# Patient Record
Sex: MALE | Race: WHITE
[De-identification: names, ages, dates, MRNs, and addresses within clinical notes are randomized per-mention and may not be internally consistent; named-entity substitution may affect disease eponyms.]

---

## 2019-08-01 ENCOUNTER — HOSPITAL ENCOUNTER (INPATIENT)
Dept: HOSPITAL 74 - JER | Age: 49
LOS: 5 days | Discharge: HOME | DRG: 223 | End: 2019-08-06
Payer: COMMERCIAL

## 2019-08-01 VITALS — BODY MASS INDEX: 25.9 KG/M2

## 2019-08-01 DIAGNOSIS — K31.9: ICD-10-CM

## 2019-08-01 DIAGNOSIS — K65.9: ICD-10-CM

## 2019-08-01 DIAGNOSIS — F17.210: ICD-10-CM

## 2019-08-01 DIAGNOSIS — I83.93: ICD-10-CM

## 2019-08-01 DIAGNOSIS — I10: ICD-10-CM

## 2019-08-01 DIAGNOSIS — E66.9: ICD-10-CM

## 2019-08-01 DIAGNOSIS — E87.2: ICD-10-CM

## 2019-08-01 DIAGNOSIS — M21.612: ICD-10-CM

## 2019-08-01 DIAGNOSIS — K29.60: ICD-10-CM

## 2019-08-01 DIAGNOSIS — M54.5: ICD-10-CM

## 2019-08-01 DIAGNOSIS — M54.9: ICD-10-CM

## 2019-08-01 DIAGNOSIS — K25.5: Primary | ICD-10-CM

## 2019-08-01 DIAGNOSIS — T39.395A: ICD-10-CM

## 2019-08-01 DIAGNOSIS — Z79.1: ICD-10-CM

## 2019-08-01 DIAGNOSIS — R62.59: ICD-10-CM

## 2019-08-01 LAB
ALBUMIN SERPL-MCNC: 4.2 G/DL (ref 3.4–5)
ALP SERPL-CCNC: 106 U/L (ref 45–117)
ALT SERPL-CCNC: 28 U/L (ref 13–61)
ANION GAP SERPL CALC-SCNC: 8 MMOL/L (ref 8–16)
AST SERPL-CCNC: 13 U/L (ref 15–37)
BASOPHILS # BLD: 0.4 % (ref 0–2)
BILIRUB SERPL-MCNC: 0.6 MG/DL (ref 0.2–1)
BUN SERPL-MCNC: 12.5 MG/DL (ref 7–18)
CALCIUM SERPL-MCNC: 10.1 MG/DL (ref 8.5–10.1)
CHLORIDE SERPL-SCNC: 103 MMOL/L (ref 98–107)
CO2 SERPL-SCNC: 28 MMOL/L (ref 21–32)
CREAT SERPL-MCNC: 1 MG/DL (ref 0.55–1.3)
DEPRECATED RDW RBC AUTO: 14 % (ref 11.9–15.9)
EOSINOPHIL # BLD: 0.3 % (ref 0–4.5)
GLUCOSE SERPL-MCNC: 169 MG/DL (ref 74–106)
HCT VFR BLD CALC: 47 % (ref 35.4–49)
HGB BLD-MCNC: 16 GM/DL (ref 11.7–16.9)
INR BLD: 0.94 (ref 0.83–1.09)
LIPASE SERPL-CCNC: 544 U/L (ref 73–393)
LYMPHOCYTES # BLD: 7.1 % (ref 8–40)
MCH RBC QN AUTO: 30.7 PG (ref 25.7–33.7)
MCHC RBC AUTO-ENTMCNC: 34.1 G/DL (ref 32–35.9)
MCV RBC: 90 FL (ref 80–96)
MONOCYTES # BLD AUTO: 4.5 % (ref 3.8–10.2)
NEUTROPHILS # BLD: 87.7 % (ref 42.8–82.8)
PLATELET # BLD AUTO: 229 K/MM3 (ref 134–434)
PMV BLD: 9.3 FL (ref 7.5–11.1)
POTASSIUM SERPLBLD-SCNC: 4.4 MMOL/L (ref 3.5–5.1)
PROT SERPL-MCNC: 7.8 G/DL (ref 6.4–8.2)
PT PNL PPP: 11.1 SEC (ref 9.7–13)
RBC # BLD AUTO: 5.23 M/MM3 (ref 4–5.6)
SODIUM SERPL-SCNC: 139 MMOL/L (ref 136–145)
WBC # BLD AUTO: 14.9 K/MM3 (ref 4–10)

## 2019-08-01 PROCEDURE — 0DU907Z SUPPLEMENT DUODENUM WITH AUTOLOGOUS TISSUE SUBSTITUTE, OPEN APPROACH: ICD-10-PCS

## 2019-08-01 RX ADMIN — PANTOPRAZOLE SODIUM ONE MG: 40 INJECTION, POWDER, FOR SOLUTION INTRAVENOUS at 21:05

## 2019-08-01 NOTE — CONSULT
Consultation: 


REQUESTING PROVIDER:Dr. Qureshi





CONSULT REQUEST: We have been asked to medically evaluate this patient for high 

blood pressure.





HISTORY OF PRESENT ILLNESS:


Patient is a 49 year old male with no significant past medical history, 

presented to the ED due to sudden onset diffuse abdominal pain, radiating to 

the back that started this morning. Patient reported he experienced severe 

diffuse abdominal pain that woke him up this morning, that was accompanied by 

abdominal bloating. He denies any fever, chills, nausea or vomiting, diarrhea, 

constipation. Patient reported his only medication is Advil which he takes as 

needed for chronic back pain. Due to persistent pain, patient came to the ED.


At the ED, CT scan of the abdomen revealed pneumoperitoeum concerning for 

viscus perforation, free air within the right upper abdomen. Patient was 

evaluated by Surgery and sent directly to the OR.  





Past Medical History: none


Past Surgical History: Open appendectomy, left foot bunionectomy, discectomy 


Family history: Father: cancer (unknown as to type)


Social: Current smoker on and off for past 30 years. Denies alcohol 

consumption. Denies illicit drug use. 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 

carts.





REVIEW OF SYSTEMS:


CONSTITUTIONAL: 


Absent:  fever, chills, diaphoresis, generalized weakness, malaise, loss of 

appetite, weight change


HEENT: 


Absent:  rhinorrhea, nasal congestion, throat pain, throat swelling, difficulty 

swallowing, mouth swelling, ear pain, eye pain, visual changes


CARDIOVASCULAR: 


Absent: chest pain, syncope, palpitations, irregular heart rate, lightheadedness

, peripheral edema


RESPIRATORY: 


Absent: cough, shortness of breath, dyspnea with exertion, orthopnea, wheezing, 

stridor, hemoptysis


GASTROINTESTINAL: abdominal pain


Absent: abdominal distension, nausea, vomiting, diarrhea, constipation, melena, 

hematochezia


GENITOURINARY: 


Absent: dysuria, frequency, urgency, hesitancy, hematuria, flank pain, genital 

pain


MUSCULOSKELETAL: 


Absent: myalgia, arthralgia, joint swelling, back pain, neck pain


SKIN: 


Absent: rash, itching, pallor


HEMATOLOGIC/IMMUNOLOGIC: 


Absent: easy bleeding, easy bruising, lymphadenopathy, frequent infections


ENDOCRINE:


Absent: unexplained weight gain, unexplained weight loss, heat intolerance, 

cold intolerance


NEUROLOGIC: 


Absent: headache, focal weakness or paresthesias, dizziness, unsteady gait, 

seizure, mental status changes, bladder or bowel incontinence


PSYCHIATRIC: 


Absent: anxiety, depression, suicidal or homicidal ideation, hallucinations.





PHYSICAL EXAMINATION





 Vital Signs - 24 hr











  08/01/19 08/01/19 08/01/19





  11:15 16:42 17:27


 


Temperature 100.2 F H 99.8 F H 100.3 F H


 


Pulse Rate 98 H  


 


Pulse Rate [  86 97 H





Right Radial]   


 


Respiratory 19 19 20





Rate   


 


Blood Pressure 162/94  


 


Blood Pressure  136/85 132/78





[Left Arm]   


 


O2 Sat by Pulse 98 100 96





Oximetry (%)   














GENERAL: Awake, alert, and fully oriented, in mild distress.


HEAD: Normal with no signs of trauma.


EYES: PERRLA, EOMI, sclera anicteric, conjunctiva clear. 


EARS, NOSE, THROAT: Dry mucous membranes.


NECK: Normal range of motion, supple.


LUNGS: Breath sounds equal, clear to auscultation bilaterally.


HEART: Regular rate and rhythm, normal S1 and S2 without murmur, rub or gallop.


ABDOMEN: +Tenderness on all quadrants, nondistended, NABS. +Guarding.


UPPER EXTREMITIES: 2+ pulses, warm, well-perfused.No peripheral edema.


LOWER EXTREMITIES: 2+ pulses, warm, well-perfused. No peripheral edema. 


SKIN: Warm, dry, normal turgor, no rashes or lesions noted. 








 Laboratory Results - last 24 hr











  08/01/19 08/01/19 08/01/19





  11:53 11:53 11:56


 


WBC   


 


RBC   


 


Hgb   


 


Hct   


 


MCV   


 


MCH   


 


MCHC   


 


RDW   


 


Plt Count   


 


MPV   


 


Absolute Neuts (auto)   


 


Neutrophils %   


 


Lymphocytes %   


 


Monocytes %   


 


Eosinophils %   


 


Basophils %   


 


Nucleated RBC %   


 


PT with INR   


 


INR   


 


PTT (Actin FS)    30.8


 


Sodium   139 


 


Potassium   4.4 


 


Chloride   103 


 


Carbon Dioxide   28 


 


Anion Gap   8 


 


BUN   12.5 


 


Creatinine   1.0 


 


Est GFR (CKD-EPI)AfAm   101.26 


 


Est GFR (CKD-EPI)NonAf   87.37 


 


Random Glucose   169 H 


 


Lactic Acid   


 


Calcium   10.1 


 


Total Bilirubin   0.6 


 


AST   13 L 


 


ALT   28 


 


Alkaline Phosphatase   106 


 


Creatine Kinase  55  


 


Troponin I  < 0.02  


 


Total Protein   7.8 


 


Albumin   4.2 


 


Lipase   544 H 


 


Blood Type   


 


Antibody Screen   














  08/01/19 08/01/19 08/01/19





  11:56 11:56 13:06


 


WBC  14.9 H  


 


RBC  5.23  


 


Hgb  16.0  


 


Hct  47.0  


 


MCV  90.0  


 


MCH  30.7  


 


MCHC  34.1  


 


RDW  14.0  


 


Plt Count  229  


 


MPV  9.3  


 


Absolute Neuts (auto)  13.0 H  


 


Neutrophils %  87.7 H  


 


Lymphocytes %  7.1 L  


 


Monocytes %  4.5  


 


Eosinophils %  0.3  


 


Basophils %  0.4  


 


Nucleated RBC %  0  


 


PT with INR   11.10 


 


INR   0.94 


 


PTT (Actin FS)   


 


Sodium   


 


Potassium   


 


Chloride   


 


Carbon Dioxide   


 


Anion Gap   


 


BUN   


 


Creatinine   


 


Est GFR (CKD-EPI)AfAm   


 


Est GFR (CKD-EPI)NonAf   


 


Random Glucose   


 


Lactic Acid    2.4 H*


 


Calcium   


 


Total Bilirubin   


 


AST   


 


ALT   


 


Alkaline Phosphatase   


 


Creatine Kinase   


 


Troponin I   


 


Total Protein   


 


Albumin   


 


Lipase   


 


Blood Type   


 


Antibody Screen   














  08/01/19





  15:06


 


WBC 


 


RBC 


 


Hgb 


 


Hct 


 


MCV 


 


MCH 


 


MCHC 


 


RDW 


 


Plt Count 


 


MPV 


 


Absolute Neuts (auto) 


 


Neutrophils % 


 


Lymphocytes % 


 


Monocytes % 


 


Eosinophils % 


 


Basophils % 


 


Nucleated RBC % 


 


PT with INR 


 


INR 


 


PTT (Actin FS) 


 


Sodium 


 


Potassium 


 


Chloride 


 


Carbon Dioxide 


 


Anion Gap 


 


BUN 


 


Creatinine 


 


Est GFR (CKD-EPI)AfAm 


 


Est GFR (CKD-EPI)NonAf 


 


Random Glucose 


 


Lactic Acid 


 


Calcium 


 


Total Bilirubin 


 


AST 


 


ALT 


 


Alkaline Phosphatase 


 


Creatine Kinase 


 


Troponin I 


 


Total Protein 


 


Albumin 


 


Lipase 


 


Blood Type  B POSITIVE


 


Antibody Screen  Negative








Active Medications











Generic Name Dose Route Start Last Admin





  Trade Name Freq  PRN Reason Stop Dose Admin


 


Fentanyl  50 mcg  08/01/19 17:13  





  Sublimaze Injection -  IVPUSH   





  [id number] PRN   





  PAIN-PACU ORDER X 4 DOSES ONLY   





     





     





     


 


Heparin Sodium (Porcine)  5,000 unit  08/01/19 18:00  





  Heparin -  SQ   





  Q8H-IV CARLO   





     





     





     





     


 


Hydromorphone HCl  10 mg  08/01/19 17:15  





  Hydromorphone 10 Mg/50 Ml-Ns  PCA  08/08/19 17:14  





  PCA CARLO   





     





     





  Protocol   





     


 


Sodium Chloride  1,000 mls @ 42 mls/hr  08/01/19 15:30  08/01/19 15:46





  Normal Saline -  IV   42 mls/hr





  ASDIR CARLO   Administration





     





     





     





     


 


Lactated Ringer's  1,000 mls @ 125 mls/hr  08/01/19 17:00  





  Lactated Ringers Solution  IV   





  ASDIR CARLO   





     





     





     





     


 


Lactated Ringer's  1,000 mls @ 1,000 mls/hr  08/01/19 16:48  08/01/19 17:22





  Lactated Ringers Solution  IV  08/01/19 17:47  1,000 mls/hr





  ONCE STA   Administration





     





     





     





     


 


Levofloxacin  750 mg in 150 mls @ 100 mls/hr  08/01/19 17:01  08/01/19 17:23





  Levaquin 750 Mg Premixed Ivpb -  IVPB  08/01/19 18:30  100 mls/hr





  ONCE ONE   Administration





     





     





  Protocol   





     


 


Metronidazole  500 mg in 100 mls @ 100 mls/hr  08/01/19 18:00  





  Flagyl 500mg Premixed Ivpb -  IVPB   





  Q8H-IV CARLO   





     





     





     





     


 


Fluconazole  50 mls @ 50 mls/hr  08/01/19 17:15  





  Diflucan 200 Mg/Ns Premixed Ivpb -  IVPB   





  DAILY CARLO   





     





     





     





     


 


Lactated Ringer's  1,000 mls @ 125 mls/hr  08/01/19 17:15  





  Lactated Ringers Solution  IV   





  ASDIR CARLO   





     





     





     





     


 


Morphine Sulfate  4 mg  08/01/19 16:48  





  Morphine Sulfate  IVPUSH   





  Q4H PRN   





  PAIN LEVEL 7 - 10   





     





     





     


 


Ondansetron HCl  4 mg  08/01/19 17:13  





  Zofran Injection  IVPUSH   





  Q6H PRN   





  NAUSEA AND/OR VOMITING   





     





     





     


 


Pantoprazole Sodium  40 mg  08/01/19 17:00  08/01/19 17:20





  Protonix Iv  IVPUSH   40 mg





  DAILY CARLO   Administration





     





     





     





     


 


Promethazine HCl  12.5 mg  08/01/19 17:13  





  Phenergan Injection -  IVPUSH   





  Q6H PRN   





  NAUSEA-FOR RESCUE AFTER 15 MIN   





     





     





     











ASSESSMENT/PLAN:





#Abdominal pain 2/2 Pneumoperitoneum


-CTAP: pneumoperitoneum concerning for viscus perforation. Free air within 

right upper abdomen. Possible diffuse colonic thickening with mesenteric 

stranding noted. Dilated bowel loops concerning for illeus. 


-Surgery (Dr Qureshi) on board


-To OR for emergency exploratory laparotomy


-Lactic acid 2.4


-Continue IV fluids


-Keep NPO


-IV Antibiotics





#FEN


-IV LR @125cc/hr


-Routine bmp monitoring


-NPO





#Prophylaxis


-SCDs





#Disposition


-full code


-To OR for emergency exploratory laparotomy








Visit type





- Emergency Visit


Emergency Visit: Yes


ED Registration Date: 08/01/19


Care time: The patient presented to the Emergency Department on the above date 

and was hospitalized for further evaluation of their emergent condition.





- New Patient


This patient is new to me today: Yes


Date on this admission: 08/13/19





- Critical Care


Critical Care patient: No





ATTENDING PHYSICIAN STATEMENT





I saw and evaluated the patient.


I reviewed the resident's note and discussed the case with the resident.


I agree with the resident's findings and plan as documented.








SUBJECTIVE:








OBJECTIVE:








ASSESSMENT AND PLAN:

## 2019-08-01 NOTE — HP
Admitting History and Physical





- Admission


Chief Complaint: Abdomnial Pain


History of Present Illness: 





48yo male PMH developmental delay, HTN, painful LE varicose veins, s/p left 

foot surgery and back surgery presented to ED this afternoon report acute onset 

upper abdominal pain gradually worsening since 8AM.  He is a poor historian. He 

has never experience anything similar in the past. His pain started in the 

upper abdomen but is now diffuse.  He reports last meal was last night, he 

denies  significant symptoms of heartburn or acid reflux.  He does however take 

NSAID for back pain and is a daily smoker. He has not had endoscopy or previous 

abdominal surgery.  Family history of cancer. We were called to evaluate and 

treat.


History Source: Patient, Medical Record


Limitations to Obtaining History: No Limitations





- Past Medical History


CNS: Yes: Other (Developmental Delay)


Cardiovascular: Yes: HTN


Musculoskeletal: Yes: Chronic low back pain, Other (Left foot bunion)





- Past Surgical History


Additional Past Surgical History: 





bunion surgery, back surgery (herniated disc)





- Smoking History


Smoking history: Current every day smoker


Have you smoked in the past 12 months: Yes


Aproximately how many cigarettes per day: 3





- Alcohol/Substance Use


Hx Alcohol Use: No


History of Substance Use: reports: None





- Social History


Usual Living Arrangement: Yes: With Spouse


History of Recent Travel: Yes


Other Social History: Works a a j[employer] at [employer]





Home Medications





- Allergies


Allergies/Adverse Reactions: 


 Allergies











Allergy/AdvReac Type Severity Reaction Status Date / Time


 


No Known Allergies Allergy   Verified 08/01/19 11:56














- Home Medications


Home Medications: 


Ambulatory Orders





NK [No Known Home Medication]  08/01/19 











Family Disease History





- Family Disease History


Family Disease History: Heart Disease: Mother, CA: Father





Review of Systems





- Review of Systems


Constitutional: reports: Fever.  denies: Chills


Eyes: denies: Blind Spots, Recent Change in Vision


HENT: denies: Difficult Swallowing, Throat Pain


Cardiovascular: denies: Chest Pain, Palpitations


Respiratory: denies: Cough, SOB


Gastrointestinal: reports: Abdominal Pain, Indigestion.  denies: Constipation, 

Diarrhea


Genitourinary: denies: Burning, Discharge, Dysuria


Breasts: reports: No Symptoms Reported.  denies: Pain


Musculoskeletal: reports: Back Pain, Other (Left foot pain).  denies: Muscle 

Pain, Muscle Cramps


Integumentary: denies: Lesions, Lump, Pallor


Neurological: denies: Seizure, Syncope


Endocrine: denies: Unexplained Weight Gain, Unexplained Weight Loss


Hematology/Lymphatic: denies: Excessive Bleeding


Psychiatric: denies: Anxiety





Physical Examination


Vital Signs: 


 Vital Signs











Temperature  99.8 F H  08/01/19 16:42


 


Pulse Rate  86   08/01/19 16:42


 


Respiratory Rate  19   08/01/19 16:42


 


Blood Pressure  136/85   08/01/19 16:42


 


O2 Sat by Pulse Oximetry (%)  100   08/01/19 16:42








 Vital Signs











 Period  Temp  Pulse  Resp  BP Sys/Ferrer  Pulse Ox


 


 Last 24 Hr  99.8 F-100.3 F  86-98  19-20  132-162/78-94  











Constitutional: Yes: Well Nourished, Calm, Mild Distress.  No: No Distress


Eyes: Yes: Conjunctiva Clear, EOM Intact


HENT: Yes: Atraumatic, Normocephalic


Cardiovascular: Yes: Regular Rate and Rhythm, S1, S2.  No: Murmur


Gastrointestinal: Yes: Normal Bowel Sounds, Soft, Distention, Tenderness, 

Tenderness, Epigastrium, Tenderness, Rebound, Other (centrally typmpanitic).  No

: Abdomen, Obese, Ascites, Pulsatile Mass, Vomiting


Renal/: No: CVA Tenderness - Left, CVA Tenderness - Right


Extremities: Yes: Other (varicose veins).  No: Cool, Cyanosis


Edema: Yes


Peripheral Pulses: Left Radial: 2+, Right Radial: 2+, Left Doralis Pedis: 2+, 

Right Dorsalis Pedis: 2+, Left Femoral: 2+, Right Femoral: 2+


Labs: 


 CBC, BMP





 08/01/19 11:56 





 08/01/19 11:53 











Imaging





- Results


Cat Scan: Report Reviewed, Image Reviewed (Free air uncertain origin)





Problem List





- Problems


(1) Perforated abdominal viscus


Assessment/Plan: 


48yo male HTN with free air on CT scan and peritonitis.  He has WBC 14.9 and 

fever 100.3, otherwise hemodynamically stable





NPO and IVF hydation 


IV antibiotics 


Hospitalist, GI and ID consults


OR for emergency exploratoy laparotomy, possible bowel resection, possible 

ostomy, possible hollie patch


Discussed with patient risks, benefits and alternatives of aforemention 

Procedure, including but not limited to bleeding, infection, injury to adjacent 

structures, leak or injury, intraabdominal abscess, incisional hernia, need for 

further procedures, death; alternatives include antibiotics, delayed or no 

surgery - risks of this include failure of nonoperative therapy, perforation, 

sepsis, recurrence, death.


Patient desires to proceed with operation - will take to OR for above. Informed 

consent signed for same. 





Thank you for the opportunity to participate in the care of this patient.


 


Code(s): RTN8602 -    





(2) HTN (hypertension)


Code(s): I10 - ESSENTIAL (PRIMARY) HYPERTENSION   


Qualifiers: 


   Hypertension type: essential hypertension   Qualified Code(s): I10 - 

Essential (primary) hypertension   





(3) Abdominal pain in male


Code(s): R10.9 - UNSPECIFIED ABDOMINAL PAIN   





(4) NSAID induced gastritis


Code(s): K29.60 - OTHER GASTRITIS WITHOUT BLEEDING; T39.395A - ADVERSE EFFECT 

OF NONSTEROIDAL ANTI-INFLAMMATORY DRUGS, INIT   





(5) NSAID-associated gastropathy


Code(s): K31.9 - DISEASE OF STOMACH AND DUODENUM, UNSPECIFIED; T39.395A - 

ADVERSE EFFECT OF NONSTEROIDAL ANTI-INFLAMMATORY DRUGS, INIT

## 2019-08-01 NOTE — OP
Operative Note





- Note:


Operative Date: 08/01/19


Pre-Operative Diagnosis: perforated viscus, surgical abdomen, free air


Operation: Exploratory lapartomy, [place] Arturo Omental Patch of pre-pyloic 

gastric ulcer


Findings: 





1cm anterior perforation in pre-pyloric ulcer, omental patch applied with 2-0 

silk suture


Post-Operative Diagnosis: Other (anterior pre-pyloric ulcer)


Surgeon: Haim Qureshi


Assistant: Sg Yee


Anesthesiologist/CRNA: Dorina Daley


Anesthesia: General


Specimens Removed: none


Estimated Blood Loss (mls): 20


Drains & Tubes with Location: griffith, RUQ LINDA size 10 flat


Drains, Volume Out (mls): 900 (griffith)


Fluid Volume Replaced (mls): 1,500


Operative Report Dictated: Yes

## 2019-08-01 NOTE — CONSULT
Consultation: 


REQUESTING PROVIDER: Dr. Vick





CONSULT REQUEST: We have been asked to medically evaluate this patient for ICU 

admission





HISTORY OF PRESENT ILLNESS:


Patient is an 80 year old male with history of back pain presents with 

complaint of abdominal pain. Patient endorses sudden onset of sharp, diffuse 

abdominal pain radiating to the back that woke him up from sleep around 8AM.  

Endorses that he last ate two day old pasta Andi from a restaurant, and 

drank a glass of milk this morning, in attempt to palliate the pain. Patient 

denies nausea, or vomiting. Last bowel movement was yesterday evening described 

as loose brown bowel movement, without blood or melena. Patient endorses taking 

Advil liquid gel ocassionally for pain, last taken yesterday evening. Patient 

denies similar symptoms in past.


In ED, abdomen, pelvis CT revealed pneumoperitoneum concerning for viscus 

perforation. Free air within right upper abdomen. Possible diffuse colonic 

thickening with mesenteric stranding noted. Dilated bowel loops concerning for 

illeus. Patient noted to be diaphoretic, abdomen tensely distended. 





Past Medical History: Hypertension


Past Surgical History: Open appendectomy (as child), left foot bunionectomy, 

discectomy 


Family history: 


 Mother: heart failure


 -Father: cancer (unknown as to type)


Social: Current smoker on and off for past 30 years. Denies alcohol 

consumption. Denies illicit drug use. 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 

shopping carts.








REVIEW OF SYSTEMS:


CONSTITUTIONAL: 


Admits: diaphoresis, generalized weakness. Absent:  fever, chills, malaise, 

loss of appetite, weight change


HEENT: 


Absent: rhinorrhea, nasal congestion, throat pain, throat swelling, difficulty 

swallowing, mouth swelling, ear pain, eye pain, visual changes


CARDIOVASCULAR: 


Absent: chest pain, syncope, palpitations, irregular heart rate, lightheadedness

, peripheral edema


RESPIRATORY: 


Admits; shortness of breath. Absent: cough, dyspnea with exertion, orthopnea, 

wheezing, stridor, hemoptysis


GASTROINTESTINAL:


Admits: abdominal pain, abdominal distension. Absent: nausea, vomiting, diarrhea

, constipation, melena, hematochezia


GENITOURINARY: 


Absent: dysuria, frequency, urgency, hesitancy, hematuria, flank pain, genital 

pain


MUSCULOSKELETAL: 


Admits: back pain. Absent: myalgia, arthralgia, joint swelling, neck pain


SKIN: 


Absent: rash, itching, pallor


HEMATOLOGIC/IMMUNOLOGIC: 


Absent: easy bleeding, easy bruising, lymphadenopathy, frequent infections


ENDOCRINE:


Absent: unexplained weight gain, unexplained weight loss, heat intolerance, 

cold intolerance


NEUROLOGIC: 


Absent: headache, focal weakness or paresthesias, dizziness, unsteady gait, 

seizure, mental status changes, bladder or bowel incontinence


PSYCHIATRIC: 


Absent: anxiety, depression, suicidal or homicidal ideation, hallucinations.





PHYSICAL EXAMINATION





 Vital Signs - 24 hr











  08/01/19





  11:15


 


Temperature 100.2 F H


 


Pulse Rate 98 H


 


Respiratory 19





Rate 


 


Blood Pressure 162/94


 


O2 Sat by Pulse 98





Oximetry (%) 











GENERAL: Awake, alert, and fully oriented. Diaphoretic, in acute distress.


HEAD: Normal with no signs of trauma.


EYES: Pupils equal, round and reactive to light, extraocular movements intact, 

sclera anicteric, conjunctiva clear.


EARS, NOSE, THROAT: Ears normal, nares patent, oropharynx clear without 

exudates. Moist mucous membranes.


NECK: Normal range of motion, supple without lymphadenopathy, or JVD


LUNGS: Breath sounds equal, clear to auscultation bilaterally. No wheezes, and 

no crackles. No accessory muscle use.


HEART: Regular rate and rhythm, normal S1 and S2 without murmur, rub or gallop.


ABDOMEN: Firm, distended. Tender to light palpation x4 quadrants. Rebound 

history elicited. 


MUSCULOSKELETAL: Normal range of motion x4 joints. Strength 5/5 bilateral upper 

and lower extremities.


EXTREMITIES: 2+ radial, dorsalis pedis pulses, warm, well-perfused. No 

cyanosis. No peripheral edema.


NEUROLOGICAL:  Cranial nerves II-XII intact. Normal speech. No gross focal 

deficits. 


PSYCHIATRIC: Cooperative. Good eye contact. Appropriate mood and affect.


SKIN: Warm, diaphoretic. Horizontal well healed appendectomy surgical scar at 

right lower quadrant. 





 Laboratory Results - last 24 hr











  08/01/19 08/01/19 08/01/19





  11:53 11:53 11:56


 


WBC   


 


RBC   


 


Hgb   


 


Hct   


 


MCV   


 


MCH   


 


MCHC   


 


RDW   


 


Plt Count   


 


MPV   


 


Absolute Neuts (auto)   


 


Neutrophils %   


 


Lymphocytes %   


 


Monocytes %   


 


Eosinophils %   


 


Basophils %   


 


Nucleated RBC %   


 


PT with INR   


 


INR   


 


PTT (Actin FS)    30.8


 


Sodium   139 


 


Potassium   4.4 


 


Chloride   103 


 


Carbon Dioxide   28 


 


Anion Gap   8 


 


BUN   12.5 


 


Creatinine   1.0 


 


Est GFR (CKD-EPI)AfAm   101.26 


 


Est GFR (CKD-EPI)NonAf   87.37 


 


Random Glucose   169 H 


 


Lactic Acid   


 


Calcium   10.1 


 


Total Bilirubin   0.6 


 


AST   13 L 


 


ALT   28 


 


Alkaline Phosphatase   106 


 


Creatine Kinase  55  


 


Troponin I  < 0.02  


 


Total Protein   7.8 


 


Albumin   4.2 


 


Lipase   544 H 


 


Blood Type   


 


Antibody Screen   














  08/01/19 08/01/19 08/01/19





  11:56 11:56 13:06


 


WBC  14.9 H  


 


RBC  5.23  


 


Hgb  16.0  


 


Hct  47.0  


 


MCV  90.0  


 


MCH  30.7  


 


MCHC  34.1  


 


RDW  14.0  


 


Plt Count  229  


 


MPV  9.3  


 


Absolute Neuts (auto)  13.0 H  


 


Neutrophils %  87.7 H  


 


Lymphocytes %  7.1 L  


 


Monocytes %  4.5  


 


Eosinophils %  0.3  


 


Basophils %  0.4  


 


Nucleated RBC %  0  


 


PT with INR   11.10 


 


INR   0.94 


 


PTT (Actin FS)   


 


Sodium   


 


Potassium   


 


Chloride   


 


Carbon Dioxide   


 


Anion Gap   


 


BUN   


 


Creatinine   


 


Est GFR (CKD-EPI)AfAm   


 


Est GFR (CKD-EPI)NonAf   


 


Random Glucose   


 


Lactic Acid    2.4 H*


 


Calcium   


 


Total Bilirubin   


 


AST   


 


ALT   


 


Alkaline Phosphatase   


 


Creatine Kinase   


 


Troponin I   


 


Total Protein   


 


Albumin   


 


Lipase   


 


Blood Type   


 


Antibody Screen   














  08/01/19





  15:06


 


WBC 


 


RBC 


 


Hgb 


 


Hct 


 


MCV 


 


MCH 


 


MCHC 


 


RDW 


 


Plt Count 


 


MPV 


 


Absolute Neuts (auto) 


 


Neutrophils % 


 


Lymphocytes % 


 


Monocytes % 


 


Eosinophils % 


 


Basophils % 


 


Nucleated RBC % 


 


PT with INR 


 


INR 


 


PTT (Actin FS) 


 


Sodium 


 


Potassium 


 


Chloride 


 


Carbon Dioxide 


 


Anion Gap 


 


BUN 


 


Creatinine 


 


Est GFR (CKD-EPI)AfAm 


 


Est GFR (CKD-EPI)NonAf 


 


Random Glucose 


 


Lactic Acid 


 


Calcium 


 


Total Bilirubin 


 


AST 


 


ALT 


 


Alkaline Phosphatase 


 


Creatine Kinase 


 


Troponin I 


 


Total Protein 


 


Albumin 


 


Lipase 


 


Blood Type  B POSITIVE


 


Antibody Screen  Negative








Active Medications











Generic Name Dose Route Start Last Admin





  Trade Name Freq  PRN Reason Stop Dose Admin


 


Vancomycin HCl 1,500 mg/  500 mls @ 250 mls/hr  08/01/19 15:28  





  Dextrose  IVPB  08/01/19 17:27  





  ONCE ONE   





     





     





     





     


 


Sodium Chloride  1,000 mls @ 42 mls/hr  08/01/19 15:30  08/01/19 15:46





  Normal Saline -  IV   42 mls/hr





  ASDIR [facility]   Administration





     





     





     





     











ASSESSMENT/PLAN:


Patient is an 80 year old male with history of hypertension presents with 

complaint of abdominal pain. 





Neurologic


 -Patient is awake, alert, fully oriented.


 -Monitor for signs of mental status change





Cardiac


 -Currently normotensive. Monitor hemodynamics.





Pulmonary


 -Saturating well on room air. 


 -Monitor for change in oxygen demand. Maintain oxygen saturation greater than 

90%





Gastrointestinal 


 -CT abdomen, pelvis reveals pneumoperitoneum concerning for viscus 

perforation. Free air within right upper abdomen. Possible diffuse colonic 

thickening with mesenteric stranding noted. Dilated bowel loops concerning for 

illeus. 


 -General surgery evaluation (Dr. Qureshi)


 -Lactic acidosis 2.4; patient receiving fluid hydration, and pending surgical 

evaluation.


 -NPO





Infectious disease


 -Patient received prophylactic Vancomycin 1500mg IV, Zosyn 4.5 grams IV. 


 -Follow blood cultures





FEN


 -Fluids: IV normal saline 


 -Electrolytes: Within normal limits, follow CMP


 -Nutrition: NPO 





Prophylaxis


 -Currently holding chemical anticoagulation pending general surgery evaluation.





Disposition: Pending General Surgery evaluation.


Please re-consult as necessary for further re-evaluation. 











Visit type





- Emergency Visit


Emergency Visit: Yes


ED Registration Date: 08/01/19


Care time: The patient presented to the Emergency Department on the above date 

and was hospitalized for further evaluation of their emergent condition.





- New Patient


This patient is new to me today: Yes


Date on this admission: 08/01/19





- Critical Care


Critical Care patient: No





ATTENDING PHYSICIAN STATEMENT





I saw and evaluated the patient.


I reviewed the resident's note and discussed the case with the resident.


I agree with the resident's findings and plan as documented.








SUBJECTIVE:








OBJECTIVE:








ASSESSMENT AND PLAN:

## 2019-08-01 NOTE — PDOC
History of Present Illness





- General


Chief Complaint: Chest Pain


Stated Complaint: CHEST PAIN


Time Seen by Provider: 08/01/19 11:33





Past History





- Past Medical History


Allergies/Adverse Reactions: 


 Allergies











Allergy/AdvReac Type Severity Reaction Status Date / Time


 


No Known Allergies Allergy   Verified 08/01/19 11:56











Home Medications: 


Ambulatory Orders





NK [No Known Home Medication]  08/01/19 








COPD: No


Other medical history: leg pain





- Suicide/Smoking/Psychosocial Hx


Smoking History: Current every day smoker


Number of Cigarettes Smoked Daily: 3


Information on smoking cessation initiated: No


Hx Alcohol Use: No


Drug/Substance Use Hx: No





*Physical Exam





- Vital Signs


 Last Vital Signs











Temp Pulse Resp BP Pulse Ox


 


 100.2 F H  98 H  19   162/94   98 


 


 08/01/19 11:15  08/01/19 11:15  08/01/19 11:15  08/01/19 11:15  08/01/19 11:15














ED Treatment Course





- LABORATORY


CBC & Chemistry Diagram: 


 08/01/19 11:56





 08/01/19 11:53





Medical Decision Making





- Medical Decision Making





08/01/19 12:38


HPI


49 year old man with a history of htn and cigarette use who presents with 

epigastric pain that onset at 0800 and woke him from sleep, described as 

throbbing and radiaiting to the upper back. The patient reports the pain is 

more epigastric in location than in the chest, but he complains of diaphoresis, 

shortness of breath, and some nausea. He states he has never had a pain like 

this before. He reports the pain improved when sitting forward, but can also 

improve with lying flat. He has no other ocmplaints at bedside and endorses no 

other medication use. 





ROS


GENERAL/CONSTITUTIONAL: No fever or chills. No weakness.


HEAD, EYES, EARS, NOSE AND THROAT: No change in vision. No ear pain or 

discharge. No sore throat.


CARDIOVASCULAR: See HPI


RESPIRATORY: No cough, wheezing, or hemoptysis.


GASTROINTESTINAL: No vomiting, diarrhea or constipation.


GENITOURINARY: No dysuria, frequency, or change in urination.


MUSCULOSKELETAL: No joint or muscle swelling or pain. No neck or back pain.


SKIN: No rash


NEUROLOGIC: No headache, vertigo, loss of consciousness, or change in strength/

sensation.





PE


GENERAL: Awake, alert, and fully oriented, extremely diaphoretic and anxious, 

sitting forward


HEAD: No signs of trauma, normocephalic, atraumatic 


EYES: EOMI, sclera anicteric, conjunctiva clear


ENT:oropharynx clear without exudates. Moist mucosa


NECK: Normal ROM, supple


LUNGS: No distress, speaks full sentences, clear to auscultation bilaterally 


HEART: tachycardic rate and regular rhythm, normal S1 and S2, no murmurs, rubs 

or gallops, peripheral pulses normal and equal bilaterally. 


ABDOMEN: Soft, + epigastric and RUQ tenderness to palpation, + epigastric 

guarding, no rebound.  No masses


EXTREMITIES : Normal inspection, Normal range of motion, no edema.  No clubbing 

or cyanosis. 


NEUROLOGICAL: Cranial nerves II through XII grossly intact.  Normal speech, no 

focal sensorimotor deficits 


SKIN: Warm, Dry, normal turgor, no rashes or lesions noted





MDM


49 year old man with a history of htn and cigarette use who presents with 

epigastric pain that onset at 0800 and woke him from sleep, described as 

throbbing and radiaiting to the upper back.





DDX including but not limited to:


r/o acs


consider pancreatitis vs cholecystitis





W/U: 


- cbc, cmp, lipase, trop, ekg, cxr, abd ultrasound





TX:


- morphine





ED Course: 


EKG: normal sinus rhythm HR 97, no interval abnormalities, narrow QRS, ST and T 

wave segments and morphology normal. 





labs significiant for leukocytosis and slight elevation in lipase





tylenol dosed


CT: concerning for free air in the abdomen.


morphine redosed





surgery paged


second liter ivf started


patient npo





vancomycin, zosyn, diflucan dosed


Patient to be admitted to Surgery service. 





Ann Mayers, PGY2


Emergency Medicine











*DC/Admit/Observation/Transfer


Diagnosis at time of Disposition: 


 Perforated bowel








- Discharge Dispostion


Condition at time of disposition: Fair


Decision to Admit order: Yes





- Referrals





- Patient Instructions





- Post Discharge Activity

## 2019-08-01 NOTE — PDOC
Attending Attestation





- Resident


Resident Name: Jose FranciscoMariposa valdovinosie





- ED Attending Attestation


I have performed the following: I have examined & evaluated the patient, The 

case was reviewed & discussed with the resident, I agree w/resident's findings 

& plan, Exceptions are as noted





- HPI


HPI: 





08/01/19 12:09


49 M presenting to ED with epigastric pain radiating to L shoulder and back. Pt 

states that he awoke with the pain at 8 AM. States that he drank some milk and 

tried to have BM with no relief of the pain. Denies N/V. Denies diarrhea/

constipation. Denies CP/SOB. Pt denies ETOH use. Denies any prior abdominal 

surgeries.





- Physicial Exam


PE: 





08/01/19 12:10


GENERAL: Awake, alert, and fully oriented, in no acute distress.


HEAD: No signs of trauma


EYES: PERRLA, EOMI, sclera anicteric, conjunctiva clear


ENT: Auricles normal inspection, hearing grossly normal, nares patent, 

oropharynx clear without exudates. Moist mucosa


NECK: Nontender, no stepoffs, Normal ROM, supple, no lymphadenopathy, JVD, or 

masses


LUNGS: Breath sounds equal, clear to auscultation bilaterally.  No wheezes, and 

no crackles


HEART: Regular rate and rhythm, normal S1 and S2, no murmurs, rubs or gallops


ABDOMEN: + epigastric and RUQ TTP, + guarding


EXTREMITIES: Normal range of motion, no edema.  No clubbing or cyanosis. No 

cords, erythema, or tenderness


NEUROLOGICAL: Cranial nerves II through XII intact. 5/5 strength and sensation 

in all extremities, Normal speech, normal gait, normal cerebellar function


SKIN: Warm, Dry, normal turgor, no rashes or lesions noted.





- Critical Care Time


Total Critical Care Time: 120


Critical Care Statement: The care of this patient involved high complexity 

decision making to prevent further life threatening deterioration of the patient

's condition and/or to evaluate & treat vital organ system(s) failure or risk 

of failure.





- Medical Decision Making





08/01/19 12:13


49 M with epigastric pain radiating towards back and shoulder. Exam notable for 

epigastric and RUQ TTP. Pt febrile in ED to 100.2. Suspicious for acute amber 

vs pancreatitis. Aortic dissection less likely given equal pulses bilaterally.


- Labs, lipase, trop


- US RUQ and aorta


- Consider CT


- Pain control





08/01/19 15:29


US shows normal GB


+ free fluid in RUQ





CT obtained, shows free air, suspect perf





Abx ordered


NPO


Surgery consult paged





08/01/19 16:05


Surgery paged again, awaiting call back





08/01/19 16:21


Dr. Qureshi aware of pt

## 2019-08-01 NOTE — PN
Teaching Attending Note


Name of Resident: Kerline Sahu





ATTENDING PHYSICIAN STATEMENT





I saw and evaluated the patient.


I reviewed the resident's note and discussed the case with the resident.


I agree with the resident's findings and plan as documented.








PCP: [facility] (covered by Dr. STANLEY)





SUBJECTIVE: Complains of severe abdominal pain, diaphoresis, fever








OBJECTIVE: T 100.3 Hemodynamically Stable. Diaphoretic ++. Mildly confused.





 Last Vital Signs











Temp Pulse Resp BP Pulse Ox


 


 100.3 F H  97 H  20   132/78   96 


 


 08/01/19 17:27  08/01/19 17:27  08/01/19 17:27  08/01/19 17:27  08/01/19 17:27








HEENT - Atrauamtic, Normocephalic.


Heart - S1, S2, RRR


Lungs - clear to auscultation


Abdomen - Tender++. Decreased bowel Sounds normal.


Extremities - no edema, no calf tenderness





 Laboratory Results - last 24 hr











  08/01/19 08/01/19 08/01/19





  11:53 11:53 11:56


 


WBC   


 


RBC   


 


Hgb   


 


Hct   


 


MCV   


 


MCH   


 


MCHC   


 


RDW   


 


Plt Count   


 


MPV   


 


Absolute Neuts (auto)   


 


Neutrophils %   


 


Lymphocytes %   


 


Monocytes %   


 


Eosinophils %   


 


Basophils %   


 


Nucleated RBC %   


 


PT with INR   


 


INR   


 


PTT (Actin FS)    30.8


 


Sodium   139 


 


Potassium   4.4 


 


Chloride   103 


 


Carbon Dioxide   28 


 


Anion Gap   8 


 


BUN   12.5 


 


Creatinine   1.0 


 


Est GFR (CKD-EPI)AfAm   101.26 


 


Est GFR (CKD-EPI)NonAf   87.37 


 


Random Glucose   169 H 


 


Lactic Acid   


 


Calcium   10.1 


 


Total Bilirubin   0.6 


 


AST   13 L 


 


ALT   28 


 


Alkaline Phosphatase   106 


 


Creatine Kinase  55  


 


Troponin I  < 0.02  


 


Total Protein   7.8 


 


Albumin   4.2 


 


Lipase   544 H 


 


Blood Type   


 


Antibody Screen   














  08/01/19 08/01/19 08/01/19





  11:56 11:56 13:06


 


WBC  14.9 H  


 


RBC  5.23  


 


Hgb  16.0  


 


Hct  47.0  


 


MCV  90.0  


 


MCH  30.7  


 


MCHC  34.1  


 


RDW  14.0  


 


Plt Count  229  


 


MPV  9.3  


 


Absolute Neuts (auto)  13.0 H  


 


Neutrophils %  87.7 H  


 


Lymphocytes %  7.1 L  


 


Monocytes %  4.5  


 


Eosinophils %  0.3  


 


Basophils %  0.4  


 


Nucleated RBC %  0  


 


PT with INR   11.10 


 


INR   0.94 


 


PTT (Actin FS)   


 


Sodium   


 


Potassium   


 


Chloride   


 


Carbon Dioxide   


 


Anion Gap   


 


BUN   


 


Creatinine   


 


Est GFR (CKD-EPI)AfAm   


 


Est GFR (CKD-EPI)NonAf   


 


Random Glucose   


 


Lactic Acid    2.4 H*


 


Calcium   


 


Total Bilirubin   


 


AST   


 


ALT   


 


Alkaline Phosphatase   


 


Creatine Kinase   


 


Troponin I   


 


Total Protein   


 


Albumin   


 


Lipase   


 


Blood Type   


 


Antibody Screen   














  08/01/19





  15:06


 


WBC 


 


RBC 


 


Hgb 


 


Hct 


 


MCV 


 


MCH 


 


MCHC 


 


RDW 


 


Plt Count 


 


MPV 


 


Absolute Neuts (auto) 


 


Neutrophils % 


 


Lymphocytes % 


 


Monocytes % 


 


Eosinophils % 


 


Basophils % 


 


Nucleated RBC % 


 


PT with INR 


 


INR 


 


PTT (Actin FS) 


 


Sodium 


 


Potassium 


 


Chloride 


 


Carbon Dioxide 


 


Anion Gap 


 


BUN 


 


Creatinine 


 


Est GFR (CKD-EPI)AfAm 


 


Est GFR (CKD-EPI)NonAf 


 


Random Glucose 


 


Lactic Acid 


 


Calcium 


 


Total Bilirubin 


 


AST 


 


ALT 


 


Alkaline Phosphatase 


 


Creatine Kinase 


 


Troponin I 


 


Total Protein 


 


Albumin 


 


Lipase 


 


Blood Type  B POSITIVE


 


Antibody Screen  Negative








 Current Medications











Generic Name Dose Route Start Last Admin





  Trade Name Freq  PRN Reason Stop Dose Admin


 


Fentanyl  50 mcg  08/01/19 17:13  





  Sublimaze Injection -  IVPUSH   





  [id number] PRN   





  PAIN-PACU ORDER X 4 DOSES ONLY   





     





     





     


 


Heparin Sodium (Porcine)  5,000 unit  08/01/19 18:00  





  Heparin -  SQ   





  Q8H-IV CARLO   





     





     





     





     


 


Hydromorphone HCl  10 mg  08/01/19 17:15  





  Hydromorphone 10 Mg/50 Ml-Ns  PCA  08/08/19 17:14  





  PCA CARLO   





     





     





  Protocol   





     


 


Sodium Chloride  1,000 mls @ 42 mls/hr  08/01/19 15:30  08/01/19 15:46





  Normal Saline -  IV   42 mls/hr





  ASDIR CARLO   Administration





     





     





     





     


 


Lactated Ringer's  1,000 mls @ 125 mls/hr  08/01/19 17:00  





  Lactated Ringers Solution  IV   





  ASDIR CARLO   





     





     





     





     


 


Levofloxacin  750 mg in 150 mls @ 100 mls/hr  08/01/19 17:01  08/01/19 17:23





  Levaquin 750 Mg Premixed Ivpb -  IVPB  08/01/19 18:30  100 mls/hr





  ONCE ONE   Administration





     





     





  Protocol   





     


 


Metronidazole  500 mg in 100 mls @ 100 mls/hr  08/01/19 18:00  





  Flagyl 500mg Premixed Ivpb -  IVPB   





  Q8H-IV CARLO   





     





     





     





     


 


Fluconazole  50 mls @ 50 mls/hr  08/01/19 17:15  





  Diflucan 200 Mg/Ns Premixed Ivpb -  IVPB   





  DAILY CARLO   





     





     





     





     


 


Lactated Ringer's  1,000 mls @ 125 mls/hr  08/01/19 17:15  





  Lactated Ringers Solution  IV   





  ASDIR CARLO   





     





     





     





     


 


Morphine Sulfate  4 mg  08/01/19 16:48  





  Morphine Sulfate  IVPUSH   





  Q4H PRN   





  PAIN LEVEL 7 - 10   





     





     





     


 


Ondansetron HCl  4 mg  08/01/19 17:13  





  Zofran Injection  IVPUSH   





  Q6H PRN   





  NAUSEA AND/OR VOMITING   





     





     





     


 


Pantoprazole Sodium  40 mg  08/01/19 17:00  08/01/19 17:20





  Protonix Iv  IVPUSH   40 mg





  DAILY CARLO   Administration





     





     





     





     


 


Promethazine HCl  12.5 mg  08/01/19 17:13  





  Phenergan Injection -  IVPUSH   





  Q6H PRN   





  NAUSEA-FOR RESCUE AFTER 15 MIN   





     





     





     








 Home Medications











 Medication  Instructions  Recorded


 


NK [No Known Home Medication]  08/01/19

















ASSESSMENT AND PLAN:





49 year old male wit no significant PMH, prior toe and spine surgery, presents 

with acute onset epigastric pain this am, radiating to back, without nausea/

vomiting/blood per rectum. Currently febrile, diaphoretic, found to have 

pneumoperitoneum on CT Abdomen/Pelvis. Medicine service consulted for medical 

management. Patient has no medical history and is not on any medications. He 

takes Advil OTC for back pain but no prescription medications. 





He is currently normotensive but does have an acute abdomen with fever, 

leukocytosis, elevated lactate requiring urgent surgery. IV Abx as per Surgery. 

Dee Dee-op IV hydration, Analgesia, and DVT Px as per Surgery.





Thank you for the kind consideraion of this consultation. This patient attends 

[facility], covered by Dr. Stanley.

## 2019-08-01 NOTE — EKG
Test Reason : 

Blood Pressure : ***/*** mmHG

Vent. Rate : 097 BPM     Atrial Rate : 097 BPM

   P-R Int : 144 ms          QRS Dur : 078 ms

    QT Int : 360 ms       P-R-T Axes : 044 017 052 degrees

   QTc Int : 457 ms

 

NORMAL SINUS RHYTHM

SEPTAL INFARCT , AGE UNDETERMINED

ABNORMAL ECG

NO PREVIOUS ECGS AVAILABLE

Confirmed by PREETHI CHRISTOPHER, LENNY ([id number]) on 8/1/2019 5:17:13 PM

 

Referred By:             Confirmed By:LENNY ORO MD

## 2019-08-02 LAB
ALBUMIN SERPL-MCNC: 2.7 G/DL (ref 3.4–5)
ALP SERPL-CCNC: 51 U/L (ref 45–117)
ALT SERPL-CCNC: 64 U/L (ref 13–61)
ANION GAP SERPL CALC-SCNC: 6 MMOL/L (ref 8–16)
AST SERPL-CCNC: 44 U/L (ref 15–37)
BILIRUB SERPL-MCNC: 0.6 MG/DL (ref 0.2–1)
BUN SERPL-MCNC: 10.2 MG/DL (ref 7–18)
CALCIUM SERPL-MCNC: 8.6 MG/DL (ref 8.5–10.1)
CHLORIDE SERPL-SCNC: 107 MMOL/L (ref 98–107)
CO2 SERPL-SCNC: 26 MMOL/L (ref 21–32)
CREAT SERPL-MCNC: 0.9 MG/DL (ref 0.55–1.3)
DEPRECATED RDW RBC AUTO: 14.1 % (ref 11.9–15.9)
GLUCOSE SERPL-MCNC: 131 MG/DL (ref 74–106)
HCT VFR BLD CALC: 39.4 % (ref 35.4–49)
HGB BLD-MCNC: 13.7 GM/DL (ref 11.7–16.9)
MAGNESIUM SERPL-MCNC: 1.6 MG/DL (ref 1.8–2.4)
MCH RBC QN AUTO: 31 PG (ref 25.7–33.7)
MCHC RBC AUTO-ENTMCNC: 34.8 G/DL (ref 32–35.9)
MCV RBC: 89.2 FL (ref 80–96)
PHOSPHATE SERPL-MCNC: 3.1 MG/DL (ref 2.5–4.9)
PLATELET # BLD AUTO: 158 K/MM3 (ref 134–434)
PMV BLD: 9.9 FL (ref 7.5–11.1)
POTASSIUM SERPLBLD-SCNC: 4 MMOL/L (ref 3.5–5.1)
PROT SERPL-MCNC: 5.3 G/DL (ref 6.4–8.2)
RBC # BLD AUTO: 4.41 M/MM3 (ref 4–5.6)
SODIUM SERPL-SCNC: 139 MMOL/L (ref 136–145)
WBC # BLD AUTO: 12.3 K/MM3 (ref 4–10)

## 2019-08-02 RX ADMIN — PIPERACILLIN SODIUM,TAZOBACTAM SODIUM SCH MLS/HR: 3; .375 INJECTION, POWDER, FOR SOLUTION INTRAVENOUS at 17:50

## 2019-08-02 RX ADMIN — PANTOPRAZOLE SODIUM SCH MLS/HR: 40 INJECTION, POWDER, FOR SOLUTION INTRAVENOUS at 07:33

## 2019-08-02 RX ADMIN — HEPARIN SODIUM SCH UNIT: 5000 INJECTION, SOLUTION INTRAVENOUS; SUBCUTANEOUS at 01:28

## 2019-08-02 RX ADMIN — SODIUM CHLORIDE, POTASSIUM CHLORIDE, SODIUM LACTATE AND CALCIUM CHLORIDE SCH MLS/HR: 600; 310; 30; 20 INJECTION, SOLUTION INTRAVENOUS at 23:50

## 2019-08-02 RX ADMIN — HEPARIN SODIUM SCH UNIT: 5000 INJECTION, SOLUTION INTRAVENOUS; SUBCUTANEOUS at 09:14

## 2019-08-02 RX ADMIN — PANTOPRAZOLE SODIUM SCH MLS/HR: 40 INJECTION, POWDER, FOR SOLUTION INTRAVENOUS at 17:49

## 2019-08-02 RX ADMIN — PANTOPRAZOLE SODIUM SCH MLS/HR: 40 INJECTION, POWDER, FOR SOLUTION INTRAVENOUS at 07:34

## 2019-08-02 RX ADMIN — PIPERACILLIN SODIUM,TAZOBACTAM SODIUM SCH MLS/HR: 3; .375 INJECTION, POWDER, FOR SOLUTION INTRAVENOUS at 10:54

## 2019-08-02 RX ADMIN — SODIUM CHLORIDE, POTASSIUM CHLORIDE, SODIUM LACTATE AND CALCIUM CHLORIDE SCH MLS/HR: 600; 310; 30; 20 INJECTION, SOLUTION INTRAVENOUS at 04:55

## 2019-08-02 RX ADMIN — HEPARIN SODIUM SCH UNIT: 5000 INJECTION, SOLUTION INTRAVENOUS; SUBCUTANEOUS at 17:52

## 2019-08-02 NOTE — PN
HC Provider Note


Provider Note: 


Anesthesia Post Op Note


Pt s/p GA for ex-lap perforated viscus


Pt awake alert reports being comfortable on PCA


NG and griffith remain in situ


VSS


no apparent anesthesia complications


ALEJO Valero.

## 2019-08-02 NOTE — PN
Progress Note, Physician


Chief Complaint: 





abdominal pain 


History of Present Illness: 





50yo male PMH developmental delay, HTN, painful LE varicose veins, s/p left 

foot surgery and back surgery presented to ED this afternoon report acute onset 

upper abdominal pain gradually worsening since 8AM. stable since surgery





- Current Medication List


Current Medications: 


Active Medications





Acetaminophen (Ofirmev Injection -)  1,000 mg IVPB Q6H PRN


   PRN Reason: FEVER


   Last Admin: 08/01/19 20:20 Dose:  1,000 mg


Heparin Sodium (Porcine) (Heparin -)  5,000 unit SQ Q8H-IV CARLO


   Last Admin: 08/02/19 09:14 Dose:  5,000 unit


Hydromorphone HCl (Hydromorphone 10 Mg/50 Ml-Ns)  10 mg PCA PCA CARLO; Protocol


   Stop: 08/08/19 17:14


   Last Admin: 08/01/19 20:30 Dose:  10 mg


Fluconazole (Diflucan 200 Mg/Ns Premixed Ivpb -)  50 mls @ 50 mls/hr IVPB DAILY 

CARLO


   Last Admin: 08/02/19 10:27 Dose:  50 mls/hr


Metronidazole (Flagyl 500mg Premixed Ivpb -)  500 mg in 100 mls @ 100 mls/hr 

IVPB Q8H-IV CARLO


   Last Admin: 08/02/19 09:14 Dose:  100 mls/hr


Pantoprazole Sodium 80 mg/ (Sodium Chloride)  100 mls @ 10 mls/hr IVPB Q10H CARLO


   Last Admin: 08/02/19 07:34 Dose:  10 mls/hr


Lactated Ringer's (Lactated Ringers Solution)  1,000 mls @ 125 mls/hr IV ASDIR 

CARLO


   Last Admin: 08/02/19 04:55 Dose:  125 mls/hr


Piperacillin Sod/Tazobactam (Sod 3.375 gm/ Dextrose)  50 mls @ 100 mls/hr IVPB 

Q8H-IV CARLO; Protocol


   Last Admin: 08/02/19 10:54 Dose:  100 mls/hr


Ondansetron HCl (Zofran Injection)  4 mg IVPUSH Q8H PRN


   PRN Reason: NAUSEA











- Objective


Vital Signs: 


 Vital Signs











Temperature  98.5 F   08/02/19 13:15


 


Pulse Rate  79   08/02/19 13:15


 


Respiratory Rate  18   08/02/19 13:15


 


Blood Pressure  112/76   08/02/19 13:15


 


O2 Sat by Pulse Oximetry (%)  97   08/02/19 12:30








 Vital Signs











 Period  Temp  Pulse  Resp  BP Sys/Ferrer  Pulse Ox


 


 Last 24 Hr  98.4 F-98.7 F  71-84  16-20  124-142/68-84  90-97








 Intake & Output











 08/03/19 08/03/19 08/03/19





 07:59 15:59 23:59


 


Intake Total 1770 1200 


 


Output Total 1140 215 650


 


Balance 630 985 -650


 


Intake:   


 


  IV 1620 1000 


 


    Lactated Ringers Solution 1500 1000 





    1,000 ml @ 125 mls/hr IV   





    ASDIR CARLO Rx#:   





    [id number]   


 


    iv 120  


 


  IVPB 150 200 


 


  Oral  0 


 


Output:   


 


  Gastric Drainage 200 50 


 


  Drainage 140 165 


 


    Right Abdomen 140 165 


 


  Urine 800  650


 


    Giles 800  


 


    Void   650


 


Other:   


 


  Voiding Method Indwelling Catheter Urinal 


 


  # Unmeasured Voids   


 


    Giles  0 


 


  Bowel Movement  No 











Constitutional: Yes: Well Nourished, No Distress, Calm


Eyes: Yes: Conjunctiva Clear, EOM Intact


HENT: Yes: Atraumatic, Normocephalic


Neck: Yes: Supple, Trachea Midline


Cardiovascular: Yes: Regular Rate and Rhythm, S1, S2


Respiratory: Yes: Regular, CTA Bilaterally


Gastrointestinal: Yes: Soft, Hypoactive Bowel Sounds.  No: Distention


...Rectal Exam: Yes: Deferred


Genitourinary: No: CVA Tenderness - Left, CVA Tenderness - Right


Breast(s): No: Mass, Nipple Inversion


Musculoskeletal: No: Joint Stiffness, Joint Swelling


Extremities: No: Cold, Cool


Edema: No


Peripheral Pulses WNL: Yes


Peripheral Pulses: Left Radial: 2+, Right Radial: 2+, Left Doralis Pedis: 2+, 

Right Dorsalis Pedis: 2+, Left Femoral: 2+, Right Femoral: 2+


Integumentary: No: Jaundice, Rash, Skin Tear


Wound/Incision: Yes: Clean/Dry, Well Approximated, Staples Intact, Dressing Dry 

and Intact


Neurological: Yes: Alert, Oriented


Psychiatric: Yes: Alert, Oriented


Labs: 


 CBC, BMP





 08/02/19 08:00 





 08/02/19 08:00 





 INR, PTT











INR  0.94  (0.83-1.09)   08/01/19  11:56    














Problem List





- Problems


(1) Perforated abdominal viscus


Assessment/Plan: 


50yo male HTN POD#1 s/p Exp Lap and Arturo Patch of gastic ulcer





Strict NPO and IVF hydration 


NGT to LIWS


IV antibiotics per ID 


Medical management per hospitalist 


trend labs and correct electrolytes 


Plan for gastric leak test Tuesday 8/6











Code(s): AUY6057 -    





(2) HTN (hypertension)


Code(s): I10 - ESSENTIAL (PRIMARY) HYPERTENSION   


Qualifiers: 


   Hypertension type: essential hypertension   Qualified Code(s): I10 - 

Essential (primary) hypertension   





(3) Abdominal pain in male


Code(s): R10.9 - UNSPECIFIED ABDOMINAL PAIN   





(4) NSAID induced gastritis


Code(s): K29.60 - OTHER GASTRITIS WITHOUT BLEEDING; T39.395A - ADVERSE EFFECT 

OF NONSTEROIDAL ANTI-INFLAMMATORY DRUGS, INIT   





(5) NSAID-associated gastropathy


Code(s): K31.9 - DISEASE OF STOMACH AND DUODENUM, UNSPECIFIED; T39.395A - 

ADVERSE EFFECT OF NONSTEROIDAL ANTI-INFLAMMATORY DRUGS, INIT

## 2019-08-02 NOTE — CON.GI
Consult


Consult Specialty:: GI


Referred by:: Dr. Qureshi


Reason for Consultation:: Perforated pre-pyloric ulcer





- History of Present Illness


Chief Complaint: Abdominal pain


History of Present Illness: 





49M admitted through [facility] yesterday afternoon for evaluation of progressive 

abdominal pain. Pain began earlier yesterday morning.  CT scan revealed 

pneumoperitoneum.  He was taken to the OR and a perforated pre-pyloric gastric 

ulcer was noted and he underwent hollie patch repair.  He describes occasional 

NSAID use and is a daily cigarette smoker. Prior to the onset nof his pain he 

denied any unintentional weight loss, early satiety, change in bowel habits, 

melena, rectal bleeding. He has never had an upper endoscopy or colonoscopy.  

He may have an aunt with colon cancer.  He currently states feeling well with 

some pain at the incision site.     





- History Source


History Provided By: Patient


Limitations to Obtaining History: No Limitations





- Past Medical History


CNS: Yes: Other (Developmental Delay)


Cardio/Vascular: Yes: HTN


Psych: Yes: Other (Developmental Delay)


Musculoskeletal: Yes: Chronic low back pain, Other (Left foot bunion)





- Past Surgical History


Additional Surgical History: Lumbar spine surgery (Discogenic disease) and left 

foot surgery





- Alcohol/Substance Use


Hx Alcohol Use: No


History of Substance Use: reports: None





- Smoking History


Smoking history: Current every day smoker


Have you smoked in the past 12 months: Yes


Aproximately how many cigarettes per day: 3





- Social History


Usual Living Arrangement: With Spouse


ADL: Independent


Occupation: Works at [employer] in [place]


Place of Birth: [place]


History of Recent Travel: No





Home Medications





- Allergies


Allergies/Adverse Reactions: 


 Allergies











Allergy/AdvReac Type Severity Reaction Status Date / Time


 


No Known Allergies Allergy   Verified 19 11:56














- Home Medications


Home Medications: 


Ambulatory Orders





NK [No Known Home Medication]  19 











Family Disease History





- Family Disease History


Family Disease History: Heart Disease: Mother (: 69: ? COPD complications), 

CA: Father (: 50's: Lung Ca), Other: Mother, Brother (1, healthy), Sister (2

, healthy), Daughter (1, healthy)


Other Family History: Aunt possibly with colon cancer





Review of Systems





- Review of Systems


Constitutional: reports: Chills, Diaphoresis


Cardiovascular: denies: Chest Pain


Respiratory: denies: SOB


Gastrointestinal: reports: Abdominal Pain.  denies: Constipation, Nausea, 

Rectal Bleeding





Physical Exam-GI


Vital Signs: 


 Vital Signs











Temperature  98.9 F   19 05:50


 


Pulse Rate  83   19 06:30


 


Respiratory Rate  16   19 06:30


 


Blood Pressure  140/78   19 06:30


 


O2 Sat by Pulse Oximetry (%)  97   19 06:30











Constitutional: Yes: Calm


Eyes: No: Sclera Icterus


HENT: Yes: Other (NGT in place with scant bilious aspirate)


Cardiovascular: Yes: Regular Rate and Rhythm


Respiratory: Yes: CTA Bilaterally


Gastrointestinal Inspection: Yes: Other (surgical dressing in place in mid 

abdomen with LINDA drain adjacent to it. serosanguinous fluid in the LINDA drain.)


...Auscultate: Yes: Normoactive Bowel Sounds


...Palpate: Yes: Soft, Tenderness (TTP at surgical site)


...Percussion: No: Tympanitic


Edema: No (No LE edema)


Neurological: Yes: Alert


Labs: 


               INR, PTT











INR  0.94  (0.83-1.09)   19  11:56    








 Hepatic Panel











Total Bilirubin  0.6 mg/dL (0.2-1)   19  11:53    


 


AST  13 U/L (15-37)  L  19  11:53    


 


ALT  28 U/L (13-61)   19  11:53    


 


Alkaline Phosphatase  106 U/L ()   19  11:53    


 


Albumin  4.2 g/dl (3.4-5.0)   19  11:53    











Imaging





- Results


Cat Scan: Report Reviewed, Image Reviewed





Problem List





- Problems


(1) Perforated gastric ulcer


Assessment/Plan: 


S/P Hollie patch repair


H. pylori serology ordered


H. Pylori stool antigen (however increased false negative reate on PPI therapy)


Patient advised to avoid NSAIDs / tobacco cessation


Outpatient EGD and will need screening colonoscopy as well. Gave patient my 

card to arrange follow-up


Post op care per surgery











Code(s): K25.5 - CHRONIC OR UNSPECIFIED GASTRIC ULCER WITH PERFORATION

## 2019-08-02 NOTE — CON.ID
Consult


Consult Specialty:: infectious diseases


Referred by:: 


Reason for Consultation:: perforated viscus,post op





- History of Present Illness


Chief Complaint: abd pain


History of Present Illness: 





50yo male PMH developmental delay, HTN, painful LE varicose veins, s/p left 

foot surgery and back surgery came to the hospital because of ac onset of abd 

pain 


patient was worked up and found to ahve perforation and seen by surgery and 

patient was taken to the operating room and patient underwent surgery and is 

now post op


patient had perforation in pre-pyloric ulcer, and  omental patch applied





- History Source


History Provided By: Patient


Limitations to Obtaining History: No Limitations





- Past Medical History


CNS: Yes: Other (Developmental Delay)


Cardio/Vascular: Yes: HTN


Psych: Yes: Other (Developmental Delay)


Musculoskeletal: Yes: Chronic low back pain, Other (Left foot bunion)





- Past Surgical History


Additional Surgical History: Lumbar spine surgery (Discogenic disease) and left 

foot surgery





- Alcohol/Substance Use


Hx Alcohol Use: No


History of Substance Use: reports: None





- Smoking History


Smoking history: Current every day smoker


Have you smoked in the past 12 months: Yes


Aproximately how many cigarettes per day: 3





- Social History


Usual Living Arrangement: With Spouse


ADL: Independent


Occupation: Works at [employer] in [place]


History of Recent Travel: No





Home Medications





- Allergies


Allergies/Adverse Reactions: 


 Allergies











Allergy/AdvReac Type Severity Reaction Status Date / Time


 


No Known Allergies Allergy   Verified 19 11:56














- Home Medications


Home Medications: 


Ambulatory Orders





NK [No Known Home Medication]  19 











Family Disease History





- Family Disease History


Family Disease History: Heart Disease: Mother (: 69: ? COPD complications), 

CA: Father (: 50's: Lung Ca), Other: Mother, Brother (1, healthy), Sister (2

, healthy), Daughter (1, healthy)


Other Family History: Aunt possibly with colon cancer





Review of Systems





- Review of Systems


Constitutional: reports: No Symptoms


Eyes: reports: No Symptoms


HENT: reports: No Symptoms


Neck: reports: No Symptoms


Cardiovascular: reports: No Symptoms


Respiratory: reports: No Symptoms


Gastrointestinal: reports: Abdominal Pain


Genitourinary: reports: No Symptoms


Musculoskeletal: reports: No Symptoms


Integumentary: reports: No Symptoms


Neurological: reports: No Symptoms


Endocrine: reports: No Symptoms


Hematology/Lymphatic: reports: No Symptoms


Psychiatric: reports: No Symptoms





Physical Exam


Vital Signs: 


 Vital Signs











Temperature  98.9 F   19 05:50


 


Pulse Rate  86   19 08:30


 


Respiratory Rate  18   19 08:30


 


Blood Pressure  130/75   19 08:30


 


O2 Sat by Pulse Oximetry (%)  97   19 08:30











Constitutional: Yes: No Distress, Calm


HENT: Yes: Atraumatic, Normocephalic


Cardiovascular: Yes: Regular Rate and Rhythm


Respiratory: Yes: Regular, CTA Bilaterally


Gastrointestinal: Yes: Other (ng tube in place,absent bowel sounds)


Musculoskeletal: Yes: WNL


Extremities: Yes: WNL


Neurological: Yes: Alert


Psychiatric: Yes: Alert


Labs: 


 CBC, BMP





 19 08:00 





 19 08:00 











Imaging





- Results


Chest X-ray: Report Reviewed, Image Reviewed


Cat Scan: Report Reviewed, Image Reviewed


Ultrasound: Report Reviewed, Image Reviewed





Assessment/Plan





Problem List





- Problems


(1) Perforated abdominal viscus


Code(s): NZJ1821 -    





(2) HTN (hypertension)


Code(s): I10 - ESSENTIAL (PRIMARY) HYPERTENSION   


Qualifiers: 


   Hypertension type: essential hypertension   Qualified Code(s): I10 - 

Essential (primary) hypertension   





(3) Abdominal pain in male


Code(s): R10.9 - UNSPECIFIED ABDOMINAL PAIN   





(4) NSAID induced gastritis


Code(s): K29.60 - OTHER GASTRITIS WITHOUT BLEEDING; T39.395A - ADVERSE EFFECT 

OF NONSTEROIDAL ANTI-INFLAMMATORY DRUGS, INIT   





(5) NSAID-associated gastropathy


Code(s): K31.9 - DISEASE OF STOMACH AND DUODENUM, UNSPECIFIED; T39.395A - 

ADVERSE EFFECT OF NONSTEROIDAL ANTI-INFLAMMATORY DRUGS, INIT   





plan





continue current mgmt


would s tart patient on zosyn


iv fluids


rest as per surgery

## 2019-08-03 RX ADMIN — HEPARIN SODIUM SCH UNIT: 5000 INJECTION, SOLUTION INTRAVENOUS; SUBCUTANEOUS at 16:59

## 2019-08-03 RX ADMIN — PANTOPRAZOLE SODIUM SCH MLS/HR: 40 INJECTION, POWDER, FOR SOLUTION INTRAVENOUS at 03:40

## 2019-08-03 RX ADMIN — PANTOPRAZOLE SODIUM SCH MLS/HR: 40 INJECTION, POWDER, FOR SOLUTION INTRAVENOUS at 15:05

## 2019-08-03 RX ADMIN — HEPARIN SODIUM SCH UNIT: 5000 INJECTION, SOLUTION INTRAVENOUS; SUBCUTANEOUS at 02:56

## 2019-08-03 RX ADMIN — HEPARIN SODIUM SCH UNIT: 5000 INJECTION, SOLUTION INTRAVENOUS; SUBCUTANEOUS at 10:43

## 2019-08-03 RX ADMIN — PIPERACILLIN SODIUM,TAZOBACTAM SODIUM SCH MLS/HR: 3; .375 INJECTION, POWDER, FOR SOLUTION INTRAVENOUS at 09:44

## 2019-08-03 RX ADMIN — PIPERACILLIN SODIUM,TAZOBACTAM SODIUM SCH MLS/HR: 3; .375 INJECTION, POWDER, FOR SOLUTION INTRAVENOUS at 18:04

## 2019-08-03 RX ADMIN — PIPERACILLIN SODIUM,TAZOBACTAM SODIUM SCH MLS/HR: 3; .375 INJECTION, POWDER, FOR SOLUTION INTRAVENOUS at 02:55

## 2019-08-03 RX ADMIN — Medication SCH MLS/HR: at 11:26

## 2019-08-03 NOTE — PN
Progress Note (short form)





- Note


Progress Note: 





Anesthesia/Pain Post op


Pt seen and examined


S:alert and awake comfortable


O:


 Vital Signs











Temperature  98.7 F   08/03/19 06:00


 


Pulse Rate  73   08/03/19 06:44


 


Respiratory Rate  18   08/03/19 06:44


 


Blood Pressure  138/84   08/03/19 06:44


 


O2 Sat by Pulse Oximetry (%)  97   08/03/19 06:44








 CBC, BMP





 08/02/19 08:00 





 08/02/19 08:00 





A/P:


Current Active Problems





Abdominal pain in male (Acute)


HTN (hypertension) (Acute)


NSAID induced gastritis (Acute)


NSAID-associated gastropathy (Acute)


Perforated abdominal viscus (Acute)


Perforated gastric ulcer (Acute)


Pyloric ulcer (Acute)


s/p ex lap


Doing well post op


Pain well controlled


Continue current care


Gurmeet Wu MD

## 2019-08-03 NOTE — PN
Progress Note, Physician


Chief Complaint: 





abdominal pain 


History of Present Illness: 





48yo male PMH developmental delay, HTN, painful LE varicose veins, s/p left 

foot surgery and back surgery presented to ED this afternoon report acute onset 

upper abdominal pain gradually worsening since 8AM. stable since surgery





- Current Medication List


Current Medications: 


Active Medications





Acetaminophen (Ofirmev Injection -)  1,000 mg IVPB Q6H PRN


   PRN Reason: FEVER


   Last Admin: 08/01/19 20:20 Dose:  1,000 mg


Heparin Sodium (Porcine) (Heparin -)  5,000 unit SQ Q8H-IV CARLO


   Last Admin: 08/03/19 10:43 Dose:  5,000 unit


Hydromorphone HCl (Hydromorphone 10 Mg/50 Ml-Ns)  10 mg PCA PCA CARLO; Protocol


   Stop: 08/08/19 17:14


   Last Admin: 08/01/19 20:30 Dose:  10 mg


Metronidazole (Flagyl 500mg Premixed Ivpb -)  500 mg in 100 mls @ 100 mls/hr 

IVPB Q8H-IV CARLO


   Last Admin: 08/03/19 10:43 Dose:  100 mls/hr


Pantoprazole Sodium 80 mg/ (Sodium Chloride)  100 mls @ 10 mls/hr IVPB Q10H CARLO


   Last Admin: 08/03/19 15:05 Dose:  10 mls/hr


Lactated Ringer's (Lactated Ringers Solution)  1,000 mls @ 125 mls/hr IV ASDIR 

CARLO


   Last Admin: 08/02/19 23:50 Dose:  125 mls/hr


Piperacillin Sod/Tazobactam (Sod 3.375 gm/ Dextrose)  50 mls @ 100 mls/hr IVPB 

Q8H-IV CARLO; Protocol


   Last Admin: 08/03/19 09:44 Dose:  100 mls/hr


Fluconazole (Diflucan 100 Mg/Ns Premixed Ivpb -)  50 mls @ 50 mls/hr IVPB DAILY 

CARLO


   Last Admin: 08/03/19 11:26 Dose:  50 mls/hr


Ondansetron HCl (Zofran Injection)  4 mg IVPUSH Q8H PRN


   PRN Reason: NAUSEA











- Objective


Vital Signs: 


 Vital Signs











Temperature  98.4 F   08/03/19 09:15


 


Pulse Rate  71   08/03/19 09:15


 


Respiratory Rate  18   08/03/19 09:15


 


Blood Pressure  142/77   08/03/19 09:15


 


O2 Sat by Pulse Oximetry (%)  97   08/03/19 09:00








 Vital Signs











 Period  Temp  Pulse  Resp  BP Sys/Ferrer  Pulse Ox


 


 Last 24 Hr  98.4 F-98.7 F  71-84  16-20  124-142/68-84  90-97








 Intake & Output











 08/03/19 08/03/19 08/03/19





 07:59 15:59 23:59


 


Intake Total 1770 1200 


 


Output Total 1140 215 650


 


Balance 630 985 -650


 


Intake:   


 


  IV 1620 1000 


 


    Lactated Ringers Solution 1500 1000 





    1,000 ml @ 125 mls/hr IV   





    ASDIR CARLO Rx#:   





    [id number]   


 


    iv 120  


 


  IVPB 150 200 


 


  Oral  0 


 


Output:   


 


  Gastric Drainage 200 50 


 


  Drainage 140 165 


 


    Right Abdomen 140 165 


 


  Urine 800  650


 


    Giles 800  


 


    Void   650


 


Other:   


 


  Voiding Method Indwelling Catheter Urinal 


 


  # Unmeasured Voids   


 


    Giles  0 


 


  Bowel Movement  No 











Constitutional: Yes: Well Nourished, No Distress, Calm


Eyes: Yes: Conjunctiva Clear, EOM Intact


HENT: Yes: Atraumatic, Normocephalic


Neck: Yes: Supple, Trachea Midline


Cardiovascular: Yes: Regular Rate and Rhythm, S1, S2


Respiratory: Yes: Regular, CTA Bilaterally


Gastrointestinal: Yes: Normal Bowel Sounds, Soft, Tenderness (incisonal).  No: 

Distention


...Rectal Exam: Yes: Deferred.  No: Guaiac Negative, Induration


Genitourinary: No: CVA Tenderness - Left, CVA Tenderness - Right


Breast(s): No: Mass, Skin Changes


Musculoskeletal: No: Back Pain, Joint Swelling


Extremities: No: Cool, Cyanosis


Peripheral Pulses WNL: Yes


Integumentary: No: Incision, Jaundice


Wound/Incision: Yes: Clean/Dry, Well Approximated, Staples Intact, Open to air


Neurological: Yes: Alert, Oriented


Psychiatric: Yes: Alert, Oriented


Labs: 


 CBC, BMP





 08/02/19 08:00 





 08/02/19 08:00 





 INR, PTT











INR  0.94  (0.83-1.09)   08/01/19  11:56    














Problem List





- Problems


(1) Perforated abdominal viscus


Assessment/Plan: 


48yo male HTN POD#2 s/p Exp Lap and Arturo Patch of gastic ulcer





Strict NPO and IVF hydration 


NGT to LIWS


IV antibiotics per ID 


Medical management per hospitalist 


trend labs and correct electrolytes 


Plan for gastric leak test Tuesday 8/6











Code(s): AIM9362 -    





(2) HTN (hypertension)


Code(s): I10 - ESSENTIAL (PRIMARY) HYPERTENSION   


Qualifiers: 


   Hypertension type: essential hypertension   Qualified Code(s): I10 - 

Essential (primary) hypertension   





(3) Abdominal pain in male


Code(s): R10.9 - UNSPECIFIED ABDOMINAL PAIN   





(4) NSAID induced gastritis


Code(s): K29.60 - OTHER GASTRITIS WITHOUT BLEEDING; T39.395A - ADVERSE EFFECT 

OF NONSTEROIDAL ANTI-INFLAMMATORY DRUGS, INIT   





(5) NSAID-associated gastropathy


Code(s): K31.9 - DISEASE OF STOMACH AND DUODENUM, UNSPECIFIED; T39.395A - 

ADVERSE EFFECT OF NONSTEROIDAL ANTI-INFLAMMATORY DRUGS, INIT

## 2019-08-03 NOTE — PN
Progress Note, Physician


History of Present Illness: 





Pt seen and examined. Events noted. Ex-lap/Arturo patch POD #2. Pt states he 

feels well. Denies abd pain. NGT remains in place, no flatus/BM yet. 





- Current Medication List


Current Medications: 


Active Medications





Acetaminophen (Ofirmev Injection -)  1,000 mg IVPB Q6H PRN


   PRN Reason: FEVER


   Last Admin: 08/01/19 20:20 Dose:  1,000 mg


Heparin Sodium (Porcine) (Heparin -)  5,000 unit SQ Q8H-IV CARLO


   Last Admin: 08/03/19 10:43 Dose:  5,000 unit


Hydromorphone HCl (Hydromorphone 10 Mg/50 Ml-Ns)  10 mg PCA PCA CARLO; Protocol


   Stop: 08/08/19 17:14


   Last Admin: 08/01/19 20:30 Dose:  10 mg


Metronidazole (Flagyl 500mg Premixed Ivpb -)  500 mg in 100 mls @ 100 mls/hr 

IVPB Q8H-IV CARLO


   Last Admin: 08/03/19 10:43 Dose:  100 mls/hr


Pantoprazole Sodium 80 mg/ (Sodium Chloride)  100 mls @ 10 mls/hr IVPB Q10H CARLO


   Stop: 08/04/19 06:00


   Last Admin: 08/03/19 15:05 Dose:  10 mls/hr


Lactated Ringer's (Lactated Ringers Solution)  1,000 mls @ 125 mls/hr IV ASDIR 

CARLO


   Last Admin: 08/02/19 23:50 Dose:  125 mls/hr


Piperacillin Sod/Tazobactam (Sod 3.375 gm/ Dextrose)  50 mls @ 100 mls/hr IVPB 

Q8H-IV CARLO; Protocol


   Last Admin: 08/03/19 09:44 Dose:  100 mls/hr


Fluconazole (Diflucan 100 Mg/Ns Premixed Ivpb -)  50 mls @ 50 mls/hr IVPB DAILY 

CARLO


   Last Admin: 08/03/19 11:26 Dose:  50 mls/hr


Ondansetron HCl (Zofran Injection)  4 mg IVPUSH Q8H PRN


   PRN Reason: NAUSEA











- Objective


Vital Signs: 


 Vital Signs











Temperature  98.1 F   08/03/19 15:18


 


Pulse Rate  63   08/03/19 15:18


 


Respiratory Rate  18   08/03/19 09:15


 


Blood Pressure  145/88   08/03/19 15:18


 


O2 Sat by Pulse Oximetry (%)  97   08/03/19 09:00











Constitutional: Yes: No Distress, Calm


Cardiovascular: Yes: Regular Rate and Rhythm


Respiratory: Yes: Regular


Gastrointestinal: Yes: Soft


Genitourinary: Yes: WNL


Musculoskeletal: Yes: WNL


Extremities: Yes: WNL


Edema: No


Integumentary: Yes: WNL


Wound/Incision: Yes: Other (Drain with serosanguinous fluid, dressing intact)


Neurological: Yes: Alert


Labs: 


 CBC, BMP





 08/02/19 08:00 





 08/02/19 08:00 





 INR, PTT











INR  0.94  (0.83-1.09)   08/01/19  11:56    








 Microbiology





08/01/19 14:56   Blood - Peripheral Venous   Blood Culture - Preliminary


                            NO GROWTH OBTAINED AFTER 48 HOURS, INCUBATION TO 

CONTINUE


                            FOR 3 DAYS.


08/01/19 15:06   Blood - Peripheral Venous   Blood Culture - Preliminary


                            NO GROWTH OBTAINED AFTER 48 HOURS, INCUBATION TO 

CONTINUE


                            FOR 3 DAYS.


08/01/19 18:50   Peritoneal Cavity Swab   Gram Stain - Final


08/01/19 18:50   Peritoneal Cavity Swab   Wound Culture - Preliminary


                            Yeast Like Organism











- ....Imaging


Cat Scan: Report Reviewed


Ultrasound: Report Reviewed





Problem List





- Problems


(1) HTN (hypertension)


Code(s): I10 - ESSENTIAL (PRIMARY) HYPERTENSION   


Qualifiers: 


   Hypertension type: essential hypertension   Qualified Code(s): I10 - 

Essential (primary) hypertension   





(2) NSAID induced gastritis


Code(s): K29.60 - OTHER GASTRITIS WITHOUT BLEEDING; T39.395A - ADVERSE EFFECT 

OF NONSTEROIDAL ANTI-INFLAMMATORY DRUGS, INIT   





(3) Perforated abdominal viscus


Code(s): FMQ9822 -    





(4) Perforated gastric ulcer


Code(s): K25.5 - CHRONIC OR UNSPECIFIED GASTRIC ULCER WITH PERFORATION   





Assessment/Plan





Perforated abd viscus/nsaid-induced gastric ulcer s/p Ex-lap/Arturo patch POD#2





-- continue antibiotics empirically for now


-- monitor cbc/bmp, vitals


-- surgery following

## 2019-08-03 NOTE — PN
Progress Note (short form)





- Note


Progress Note: 





H. Pylori serologies negative


D/C PPI drip


BID IV PPI then to oral when OK with surgery








Problem List





- Problems


(1) Perforated gastric ulcer


Code(s): K25.5 - CHRONIC OR UNSPECIFIED GASTRIC ULCER WITH PERFORATION

## 2019-08-03 NOTE — PN
Progress Note, Physician


Chief Complaint: 





Abdominal pain


abdominal perforation





History of Present Illness: 





NAD


OOB


POD#2 s/p Exp Lap and Arturo Patch of gastic ulcer


Seen by Surgery, GI and ID


PPI drip discontinue, switched to IV BID


NPO until tuesday for small bowel series


NGT at low wall suction


H pylori negative





- Current Medication List


Current Medications: 


Active Medications





Acetaminophen (Ofirmev Injection -)  1,000 mg IVPB Q6H PRN


   PRN Reason: FEVER


   Last Admin: 08/01/19 20:20 Dose:  1,000 mg


Heparin Sodium (Porcine) (Heparin -)  5,000 unit SQ Q8H-IV CARLO


   Last Admin: 08/03/19 16:59 Dose:  5,000 unit


Hydromorphone HCl (Hydromorphone 10 Mg/50 Ml-Ns)  10 mg PCA PCA CARLO; Protocol


   Stop: 08/08/19 17:14


   Last Admin: 08/01/19 20:30 Dose:  10 mg


Metronidazole (Flagyl 500mg Premixed Ivpb -)  500 mg in 100 mls @ 100 mls/hr 

IVPB Q8H-IV CARLO


   Last Admin: 08/03/19 17:00 Dose:  100 mls/hr


Piperacillin Sod/Tazobactam (Sod 3.375 gm/ Dextrose)  50 mls @ 100 mls/hr IVPB 

Q8H-IV CARLO; Protocol


   Last Admin: 08/03/19 18:04 Dose:  100 mls/hr


Fluconazole (Diflucan 100 Mg/Ns Premixed Ivpb -)  50 mls @ 50 mls/hr IVPB DAILY 

[facility]


   Last Admin: 08/03/19 11:26 Dose:  50 mls/hr


Potassium Chloride/Dextrose/Sod Cl (D5-1/2ns+20 Meq Kcl -)  20 meq in 1,000 mls 

@ 125 mls/hr IV ASDIR CARLO


   Last Admin: 08/03/19 18:04 Dose:  125 mls/hr


Ondansetron HCl (Zofran Injection)  4 mg IVPUSH Q8H PRN


   PRN Reason: NAUSEA


Pantoprazole Sodium (Protonix Iv)  40 mg IVPB BID CARLO











- Objective


Vital Signs: 


 Vital Signs











Temperature  98.1 F   08/03/19 15:18


 


Pulse Rate  63   08/03/19 15:18


 


Respiratory Rate  18   08/03/19 15:03


 


Blood Pressure  145/88   08/03/19 15:18


 


O2 Sat by Pulse Oximetry (%)  96   08/03/19 15:03











Constitutional: Yes: Well Nourished, No Distress, Calm


Cardiovascular: Yes: Regular Rate and Rhythm


Respiratory: Yes: Regular


Gastrointestinal: Yes: WNL


Musculoskeletal: Yes: WNL


Extremities: Yes: WNL


Edema: No


Peripheral Pulses WNL: Yes


Neurological: Yes: Alert, Oriented


Psychiatric: Yes: Alert, Oriented


Labs: 


 CBC, BMP





 08/02/19 08:00 





 08/02/19 08:00 





 INR, PTT











INR  0.94  (0.83-1.09)   08/01/19  11:56    














Problem List





- Problems


(1) NSAID-associated gastropathy


Assessment/Plan: 


-Avoid NSAID's


-PPI IV BID


-switch to po upon discharge


-seen by GI


Code(s): K31.9 - DISEASE OF STOMACH AND DUODENUM, UNSPECIFIED; T39.395A - 

ADVERSE EFFECT OF NONSTEROIDAL ANTI-INFLAMMATORY DRUGS, INIT   





(2) Perforated abdominal viscus


Assessment/Plan: 


-Surgery on board


-NGT to LWIS


-NPO until small bowel series on Tuesday


-IVF


-ID on board


-Started on IV fluconazole, flagyl and zosyn


Code(s): YTA2755 -    





(3) Lactic acidosis


Assessment/Plan: 


-recheck labs in AM


-Continue IVF for now


Code(s): E87.2 - ACIDOSIS   





Assessment/Plan





see problem list

## 2019-08-04 LAB
ALBUMIN SERPL-MCNC: 2.7 G/DL (ref 3.4–5)
ALP SERPL-CCNC: 51 U/L (ref 45–117)
ALT SERPL-CCNC: 37 U/L (ref 13–61)
ANION GAP SERPL CALC-SCNC: 6 MMOL/L (ref 8–16)
AST SERPL-CCNC: 24 U/L (ref 15–37)
BASOPHILS # BLD: 0.4 % (ref 0–2)
BILIRUB SERPL-MCNC: 0.6 MG/DL (ref 0.2–1)
BUN SERPL-MCNC: 17.3 MG/DL (ref 7–18)
CALCIUM SERPL-MCNC: 8.8 MG/DL (ref 8.5–10.1)
CHLORIDE SERPL-SCNC: 106 MMOL/L (ref 98–107)
CO2 SERPL-SCNC: 29 MMOL/L (ref 21–32)
CREAT SERPL-MCNC: 1 MG/DL (ref 0.55–1.3)
DEPRECATED RDW RBC AUTO: 14 % (ref 11.9–15.9)
EOSINOPHIL # BLD: 1.1 % (ref 0–4.5)
GLUCOSE SERPL-MCNC: 122 MG/DL (ref 74–106)
HCT VFR BLD CALC: 38.6 % (ref 35.4–49)
HGB BLD-MCNC: 13.3 GM/DL (ref 11.7–16.9)
LYMPHOCYTES # BLD: 16.7 % (ref 8–40)
MCH RBC QN AUTO: 30.7 PG (ref 25.7–33.7)
MCHC RBC AUTO-ENTMCNC: 34.5 G/DL (ref 32–35.9)
MCV RBC: 89.1 FL (ref 80–96)
MONOCYTES # BLD AUTO: 6.7 % (ref 3.8–10.2)
NEUTROPHILS # BLD: 75.1 % (ref 42.8–82.8)
PLATELET # BLD AUTO: 187 K/MM3 (ref 134–434)
PMV BLD: 9.5 FL (ref 7.5–11.1)
POTASSIUM SERPLBLD-SCNC: 3.8 MMOL/L (ref 3.5–5.1)
PROT SERPL-MCNC: 5.7 G/DL (ref 6.4–8.2)
RBC # BLD AUTO: 4.33 M/MM3 (ref 4–5.6)
SODIUM SERPL-SCNC: 141 MMOL/L (ref 136–145)
WBC # BLD AUTO: 7.5 K/MM3 (ref 4–10)

## 2019-08-04 RX ADMIN — Medication SCH MLS/HR: at 12:12

## 2019-08-04 RX ADMIN — HEPARIN SODIUM SCH UNIT: 5000 INJECTION, SOLUTION INTRAVENOUS; SUBCUTANEOUS at 01:27

## 2019-08-04 RX ADMIN — HEPARIN SODIUM SCH UNIT: 5000 INJECTION, SOLUTION INTRAVENOUS; SUBCUTANEOUS at 17:08

## 2019-08-04 RX ADMIN — POTASSIUM CHLORIDE, DEXTROSE MONOHYDRATE AND SODIUM CHLORIDE SCH MLS/HR: 150; 5; 450 INJECTION, SOLUTION INTRAVENOUS at 17:09

## 2019-08-04 RX ADMIN — PIPERACILLIN SODIUM,TAZOBACTAM SODIUM SCH MLS/HR: 3; .375 INJECTION, POWDER, FOR SOLUTION INTRAVENOUS at 17:33

## 2019-08-04 RX ADMIN — HEPARIN SODIUM SCH UNIT: 5000 INJECTION, SOLUTION INTRAVENOUS; SUBCUTANEOUS at 09:08

## 2019-08-04 RX ADMIN — PANTOPRAZOLE SODIUM SCH MG: 40 INJECTION, POWDER, FOR SOLUTION INTRAVENOUS at 09:44

## 2019-08-04 RX ADMIN — NICOTINE SCH MG: 14 PATCH, EXTENDED RELEASE TRANSDERMAL at 15:25

## 2019-08-04 RX ADMIN — PANTOPRAZOLE SODIUM SCH MG: 40 INJECTION, POWDER, FOR SOLUTION INTRAVENOUS at 21:16

## 2019-08-04 RX ADMIN — PIPERACILLIN SODIUM,TAZOBACTAM SODIUM SCH MLS/HR: 3; .375 INJECTION, POWDER, FOR SOLUTION INTRAVENOUS at 10:23

## 2019-08-04 RX ADMIN — PANTOPRAZOLE SODIUM ONE MG: 40 INJECTION, POWDER, FOR SOLUTION INTRAVENOUS at 21:16

## 2019-08-04 RX ADMIN — PIPERACILLIN SODIUM,TAZOBACTAM SODIUM SCH MLS/HR: 3; .375 INJECTION, POWDER, FOR SOLUTION INTRAVENOUS at 01:17

## 2019-08-04 NOTE — PN
Progress Note, Physician


Chief Complaint: 





Abdominal pain


abdominal perforation





History of Present Illness: 





NAD, in bed, fmily at bedside


POD#3 s/p Exp Lap and Arturo Patch of gastric ulcer


denies any pain/N/V


Seen by Surgery, GI and ID


PPI drip discontinue, switched to IV BID


NPO until tuesday for small bowel series


NGT at low wall suction


H pylori negative





- Current Medication List


Current Medications: 


Active Medications





Acetaminophen (Ofirmev Injection -)  1,000 mg IVPB Q6H PRN


   PRN Reason: FEVER


   Last Admin: 08/01/19 20:20 Dose:  1,000 mg


Heparin Sodium (Porcine) (Heparin -)  5,000 unit SQ Q8H-IV [facility]


   Last Admin: 08/04/19 09:08 Dose:  5,000 unit


Hydromorphone HCl (Hydromorphone 10 Mg/50 Ml-Ns)  10 mg PCA PCA CARLO; Protocol


   Stop: 08/08/19 17:14


   Last Admin: 08/01/19 20:30 Dose:  10 mg


Metronidazole (Flagyl 500mg Premixed Ivpb -)  500 mg in 100 mls @ 100 mls/hr 

IVPB Q8H-IV [facility]


   Last Admin: 08/04/19 09:07 Dose:  100 mls/hr


Piperacillin Sod/Tazobactam (Sod 3.375 gm/ Dextrose)  50 mls @ 100 mls/hr IVPB 

Q8H-IV [facility]; Protocol


   Last Admin: 08/04/19 10:23 Dose:  100 mls/hr


Fluconazole (Diflucan 100 Mg/Ns Premixed Ivpb -)  50 mls @ 50 mls/hr IVPB DAILY 

[facility]


   Last Admin: 08/04/19 12:12 Dose:  50 mls/hr


Potassium Chloride/Dextrose/Sod Cl (D5-1/2ns+20 Meq Kcl -)  20 meq in 1,000 mls 

@ 125 mls/hr IV ASDIR CARLO


   Last Admin: 08/03/19 18:04 Dose:  125 mls/hr


Nicotine (Nicoderm Patch -)  14 mg TD DAILY [facility]


Ondansetron HCl (Zofran Injection)  4 mg IVPUSH Q8H PRN


   PRN Reason: NAUSEA


Pantoprazole Sodium (Protonix Iv)  40 mg IVPB BID CARLO


   Last Admin: 08/04/19 09:44 Dose:  40 mg











- Objective


Vital Signs: 


 Vital Signs











Temperature  97.9 F   08/04/19 09:00


 


Pulse Rate  68   08/04/19 09:00


 


Respiratory Rate  18   08/04/19 09:00


 


Blood Pressure  135/80   08/04/19 09:00


 


O2 Sat by Pulse Oximetry (%)  94 L  08/04/19 09:00











Constitutional: Yes: Well Nourished, No Distress, Calm


Cardiovascular: Yes: Regular Rate and Rhythm


Respiratory: Yes: Regular


Gastrointestinal: Yes: Normal Bowel Sounds, Soft, Tenderness (incisonal), Other 

(Mid abdomen staples-RK-dry)


Genitourinary: Yes: WNL


Musculoskeletal: Yes: WNL


Extremities: Yes: WNL


Edema: No


Peripheral Pulses WNL: Yes


Neurological: Yes: Alert, Oriented


Psychiatric: Yes: Alert, Oriented


Labs: 


 CBC, BMP





 08/04/19 07:45 





 08/04/19 07:45 





 INR, PTT











INR  0.94  (0.83-1.09)   08/01/19  11:56    














Problem List





- Problems


(1) NSAID-associated gastropathy


Assessment/Plan: 


-Avoid NSAID's


-PPI IV BID


-switch to po upon discharge


-seen by GI


Code(s): K31.9 - DISEASE OF STOMACH AND DUODENUM, UNSPECIFIED; T39.395A - 

ADVERSE EFFECT OF NONSTEROIDAL ANTI-INFLAMMATORY DRUGS, INIT   





(2) Perforated abdominal viscus


Assessment/Plan: 


-Surgery on board


-NGT to LWIS


-NPO until small bowel series on Tuesday


-IVF


-ID on board


-Started on IV fluconazole, flagyl and zosyn


Code(s): WAO0203 -    





(3) Lactic acidosis


Assessment/Plan: 


-resolved


-Continue IVF for now


Code(s): E87.2 - ACIDOSIS   





Assessment/Plan





see problem list

## 2019-08-04 NOTE — OP
DATE OF OPERATION:  08/01/2019

 

PREOPERATIVE DIAGNOSIS:  Perforated viscus, _____ and free air.

POSTOPERATIVE DIAGNOSIS:  Anterior prepyloric perforated ulcer.

 

PROCEDURE:  Exploratory laparotomy, [place] Arturo omental patch of the prepyloric

gastric ulcer perforation.

 

ATTENDING PHYSICIAN:  Haim Qureshi MD

 

ASSISTANT:  Sg Yee MD

ANESTHESIOLOGIST:  WALKER Rascon

 

ANESTHESIA TYPE:  General.

 

ESTIMATED BLOOD LOSS:  20 mL.

 

INTRAVENOUS FLUID ADMINISTERED:  1500 mL.

DRAINS PLACED:  Giles catheter, NG tube saline sump, size 16-Maltese and a right upper

quadrant LINDA size 10 flat to bulb suction.

SPECIMEN:  None.

 

PATHOLOGY:  Culture from the abdomen is sent.

INDICATION:  The patient is a 49-year-old male with hypertension, developmentally

delayed, with undiagnosed gastritis and a perforated gastric ulcer on CT scan with

free air.  He was explained the risks, benefits and alternatives to the surgical

procedure involved, signed informed consent, was taken for the procedure.

 

DESCRIPTION OF PROCEDURE:  The patient was brought to the operating room.  He was

placed in the supine position on the operating table with lower extremity SCDs.  He

was given intravenous Diflucan, Levaquin and Flagyl into the antibiotics in the

emergency department.  His anterior abdominal wall was shaved, prepped and draped in

standard surgical fashion to the surgical field.  A formal timeout was completed,

identifying the operative site and procedure.  

 

With all parties in agreement, we began first with an upper midline laparotomy

incision with a curvilinear extension around the umbilicus.  This was opened with a

10-blade scalpel, deepened and widened through subcutaneous tissue.  Care was taken

to dissect down through the midline fascia to the midline rectus fascia which was

elevated and then entered with Bovie cautery.  Once inside the viscera of the abdomen

was protected with a finger and the remainder of the fascia was opened to the

inferior and superior aspect of the incision.  The incision was then partially

eviscerated leading to cultures being immediately sent from the right upper quadrant

that appeared to have a murky succus.  We attempted to evaluate the stomach and in

the prepyloric area a 2-cm perforation in the area was noted.  It was closed with a

single 3-0 silk stitch interrupted fashion to control contamination.  The abdomen was

then suctioned to succus and irrigated with 2 L of sterile irrigation fluid.

We then turned our attention to laying in a Baltic Arturo omental patch.  A tunnel of

omentum was selected and 2-0 silk stitches were laid in standard fashion, 3 in total,

at the superior aspect, inferior aspect and middle aspect of the perforation. 

Omental tongue was then laid in and tied down with a 2-0 silk stitch.  Once complete,

the integrity of the stomach was tested with insufflation and elevation of gas from

the NG tube which had been placed in position across the site.  The NG tube was then

affixed and the patient had the remainder of the hemostasis obtained.  The remainder

of the gastrocolic ligament was then packed above it to wall off the collection and a

LINDA, so a 10 flat was laid across the area to act as a reservoir for remaining

drainage.

The abdomen was again irrigated with 2 L of sterile irrigation fluid.  The patient

then had the inferior and superior aspects of the midline laparotomy incision closed

with No. 1 looped PDS from a superior and inferior aspect toward the midline and tied

in the middle.  Once the knot was buried the skin was irrigated at that layer and

then closed with staples.  Sterile dressings were placed and the LINDA was secured with

a 2-0 nylon stitch.  The patient was awakened from general anesthesia, having

tolerated the procedure well.  Instrument counts were correct prior to closure of the

abdomen.  

 

MD KANCHAN Paredes/[id number]

DD: 08/03/2019 16:22

DT: 08/04/2019 18:17

Job #:  [id number]

## 2019-08-05 LAB
ALBUMIN SERPL-MCNC: 2.5 G/DL (ref 3.4–5)
ALP SERPL-CCNC: 51 U/L (ref 45–117)
ALT SERPL-CCNC: 33 U/L (ref 13–61)
ANION GAP SERPL CALC-SCNC: 6 MMOL/L (ref 8–16)
AST SERPL-CCNC: 23 U/L (ref 15–37)
BASOPHILS # BLD: 0.8 % (ref 0–2)
BILIRUB SERPL-MCNC: 0.4 MG/DL (ref 0.2–1)
BUN SERPL-MCNC: 11.6 MG/DL (ref 7–18)
CALCIUM SERPL-MCNC: 8.6 MG/DL (ref 8.5–10.1)
CHLORIDE SERPL-SCNC: 109 MMOL/L (ref 98–107)
CO2 SERPL-SCNC: 26 MMOL/L (ref 21–32)
CREAT SERPL-MCNC: 0.7 MG/DL (ref 0.55–1.3)
DEPRECATED RDW RBC AUTO: 13.9 % (ref 11.9–15.9)
EOSINOPHIL # BLD: 4.6 % (ref 0–4.5)
GLUCOSE SERPL-MCNC: 135 MG/DL (ref 74–106)
HCT VFR BLD CALC: 35 % (ref 35.4–49)
HGB BLD-MCNC: 12.1 GM/DL (ref 11.7–16.9)
LYMPHOCYTES # BLD: 21.7 % (ref 8–40)
MCH RBC QN AUTO: 30.7 PG (ref 25.7–33.7)
MCHC RBC AUTO-ENTMCNC: 34.6 G/DL (ref 32–35.9)
MCV RBC: 88.7 FL (ref 80–96)
MONOCYTES # BLD AUTO: 9.7 % (ref 3.8–10.2)
NEUTROPHILS # BLD: 63.2 % (ref 42.8–82.8)
PLATELET # BLD AUTO: 185 K/MM3 (ref 134–434)
PMV BLD: 9.6 FL (ref 7.5–11.1)
POTASSIUM SERPLBLD-SCNC: 3.6 MMOL/L (ref 3.5–5.1)
PROT SERPL-MCNC: 5.3 G/DL (ref 6.4–8.2)
RBC # BLD AUTO: 3.95 M/MM3 (ref 4–5.6)
SODIUM SERPL-SCNC: 141 MMOL/L (ref 136–145)
WBC # BLD AUTO: 5.1 K/MM3 (ref 4–10)

## 2019-08-05 RX ADMIN — HEPARIN SODIUM SCH UNIT: 5000 INJECTION, SOLUTION INTRAVENOUS; SUBCUTANEOUS at 17:35

## 2019-08-05 RX ADMIN — Medication SCH MLS/HR: at 10:31

## 2019-08-05 RX ADMIN — PANTOPRAZOLE SODIUM SCH MG: 40 INJECTION, POWDER, FOR SOLUTION INTRAVENOUS at 21:25

## 2019-08-05 RX ADMIN — PIPERACILLIN SODIUM,TAZOBACTAM SODIUM SCH MLS/HR: 3; .375 INJECTION, POWDER, FOR SOLUTION INTRAVENOUS at 17:34

## 2019-08-05 RX ADMIN — HEPARIN SODIUM SCH UNIT: 5000 INJECTION, SOLUTION INTRAVENOUS; SUBCUTANEOUS at 01:46

## 2019-08-05 RX ADMIN — PIPERACILLIN SODIUM,TAZOBACTAM SODIUM SCH MLS/HR: 3; .375 INJECTION, POWDER, FOR SOLUTION INTRAVENOUS at 10:29

## 2019-08-05 RX ADMIN — PANTOPRAZOLE SODIUM SCH MG: 40 INJECTION, POWDER, FOR SOLUTION INTRAVENOUS at 10:29

## 2019-08-05 RX ADMIN — HEPARIN SODIUM SCH UNIT: 5000 INJECTION, SOLUTION INTRAVENOUS; SUBCUTANEOUS at 10:31

## 2019-08-05 RX ADMIN — PIPERACILLIN SODIUM,TAZOBACTAM SODIUM SCH MLS/HR: 3; .375 INJECTION, POWDER, FOR SOLUTION INTRAVENOUS at 01:45

## 2019-08-05 RX ADMIN — POTASSIUM CHLORIDE, DEXTROSE MONOHYDRATE AND SODIUM CHLORIDE SCH MLS/HR: 150; 5; 450 INJECTION, SOLUTION INTRAVENOUS at 17:34

## 2019-08-05 RX ADMIN — NICOTINE SCH MG: 14 PATCH, EXTENDED RELEASE TRANSDERMAL at 10:32

## 2019-08-05 NOTE — PN
Progress Note (short form)





- Note


Progress Note: 





Pain Follow up


Patient is doing well. No pain complains.


Still have NG tube in place.


Not using PCA.


A/P


Pain meds requirment is too low.


Discontinue PCA when orals are started.





Jennifer Thao MD.

## 2019-08-05 NOTE — PN
Progress Note, Physician


History of Present Illness: 





stable


no new issues





- Current Medication List


Current Medications: 


Active Medications





Acetaminophen (Ofirmev Injection -)  1,000 mg IVPB Q6H PRN


   PRN Reason: FEVER


   Last Admin: 08/01/19 20:20 Dose:  1,000 mg


Heparin Sodium (Porcine) (Heparin -)  5,000 unit SQ Q8H-IV [facility]


   Last Admin: 08/05/19 10:31 Dose:  5,000 unit


Hydromorphone HCl (Hydromorphone 10 Mg/50 Ml-Ns)  10 mg PCA PCA CARLO; Protocol


   Stop: 08/08/19 17:14


   Last Admin: 08/01/19 20:30 Dose:  10 mg


Metronidazole (Flagyl 500mg Premixed Ivpb -)  500 mg in 100 mls @ 100 mls/hr 

IVPB Q8H-IV CARLO


   Last Admin: 08/05/19 10:30 Dose:  100 mls/hr


Piperacillin Sod/Tazobactam (Sod 3.375 gm/ Dextrose)  50 mls @ 100 mls/hr IVPB 

Q8H-IV [facility]LO; Protocol


   Last Admin: 08/05/19 10:29 Dose:  100 mls/hr


Fluconazole (Diflucan 100 Mg/Ns Premixed Ivpb -)  50 mls @ 50 mls/hr IVPB DAILY 

[facility]


   Last Admin: 08/05/19 10:31 Dose:  50 mls/hr


Potassium Chloride/Dextrose/Sod Cl (D5-1/2ns+20 Meq Kcl -)  20 meq in 1,000 mls 

@ 100 mls/hr IV ASDIR CARLO


   Last Admin: 08/04/19 17:09 Dose:  100 mls/hr


Nicotine (Nicoderm Patch -)  14 mg TD DAILY [facility]


   Last Admin: 08/05/19 10:32 Dose:  14 mg


Ondansetron HCl (Zofran Injection)  4 mg IVPUSH Q8H PRN


   PRN Reason: NAUSEA


Pantoprazole Sodium (Protonix Iv)  40 mg IVPB BID [facility]


   Last Admin: 08/05/19 10:29 Dose:  40 mg











- Objective


Vital Signs: 


 Vital Signs











Temperature  98.1 F   08/05/19 05:58


 


Pulse Rate  58 L  08/05/19 05:58


 


Respiratory Rate  18   08/05/19 05:58


 


Blood Pressure  140/88   08/05/19 05:58


 


O2 Sat by Pulse Oximetry (%)  96   08/04/19 21:00











Constitutional: Yes: No Distress, Calm


Cardiovascular: Yes: S1, S2


Respiratory: Yes: Regular, CTA Bilaterally


Gastrointestinal: Yes: Normal Bowel Sounds, Soft, Other (inscision c/d/i,drain 

in place)


Musculoskeletal: Yes: WNL


Extremities: Yes: WNL


Neurological: Yes: Alert, Oriented


Psychiatric: Yes: Alert, Oriented


Labs: 


 CBC, BMP





 08/05/19 07:07 





 08/05/19 07:07 





 INR, PTT











INR  0.94  (0.83-1.09)   08/01/19  11:56    














Assessment/Plan





Problem List





- Problems


(1) Perforated abdominal viscus


Code(s): GCE4760 -    





(2) HTN (hypertension)


Code(s): I10 - ESSENTIAL (PRIMARY) HYPERTENSION   


Qualifiers: 


   Hypertension type: essential hypertension   Qualified Code(s): I10 - 

Essential (primary) hypertension   





(3) Abdominal pain in male


Code(s): R10.9 - UNSPECIFIED ABDOMINAL PAIN   





(4) NSAID induced gastritis


Code(s): K29.60 - OTHER GASTRITIS WITHOUT BLEEDING; T39.395A - ADVERSE EFFECT 

OF NONSTEROIDAL ANTI-INFLAMMATORY DRUGS, INIT   





(5) NSAID-associated gastropathy


Code(s): K31.9 - DISEASE OF STOMACH AND DUODENUM, UNSPECIFIED; T39.395A - 

ADVERSE EFFECT OF NONSTEROIDAL ANTI-INFLAMMATORY DRUGS, INIT   





plan





continue abx


await for surgical final plan


continue antifungal


rest as per the team

## 2019-08-06 VITALS — SYSTOLIC BLOOD PRESSURE: 122 MMHG | TEMPERATURE: 97.4 F | HEART RATE: 61 BPM | DIASTOLIC BLOOD PRESSURE: 78 MMHG

## 2019-08-06 LAB
ALBUMIN SERPL-MCNC: 2.8 G/DL (ref 3.4–5)
ALP SERPL-CCNC: 65 U/L (ref 45–117)
ALT SERPL-CCNC: 44 U/L (ref 13–61)
ANION GAP SERPL CALC-SCNC: 7 MMOL/L (ref 8–16)
AST SERPL-CCNC: 37 U/L (ref 15–37)
BASOPHILS # BLD: 0.7 % (ref 0–2)
BILIRUB SERPL-MCNC: 0.4 MG/DL (ref 0.2–1)
BUN SERPL-MCNC: 9.2 MG/DL (ref 7–18)
CALCIUM SERPL-MCNC: 9 MG/DL (ref 8.5–10.1)
CHLORIDE SERPL-SCNC: 109 MMOL/L (ref 98–107)
CO2 SERPL-SCNC: 26 MMOL/L (ref 21–32)
CREAT SERPL-MCNC: 0.8 MG/DL (ref 0.55–1.3)
DEPRECATED RDW RBC AUTO: 14.1 % (ref 11.9–15.9)
EOSINOPHIL # BLD: 5.5 % (ref 0–4.5)
GLUCOSE SERPL-MCNC: 134 MG/DL (ref 74–106)
HCT VFR BLD CALC: 39.8 % (ref 35.4–49)
HGB BLD-MCNC: 13.6 GM/DL (ref 11.7–16.9)
LYMPHOCYTES # BLD: 23.3 % (ref 8–40)
MCH RBC QN AUTO: 30.6 PG (ref 25.7–33.7)
MCHC RBC AUTO-ENTMCNC: 34 G/DL (ref 32–35.9)
MCV RBC: 89.9 FL (ref 80–96)
MONOCYTES # BLD AUTO: 9.3 % (ref 3.8–10.2)
NEUTROPHILS # BLD: 61.2 % (ref 42.8–82.8)
PLATELET # BLD AUTO: 219 K/MM3 (ref 134–434)
PMV BLD: 9 FL (ref 7.5–11.1)
POTASSIUM SERPLBLD-SCNC: 3.7 MMOL/L (ref 3.5–5.1)
PROT SERPL-MCNC: 5.9 G/DL (ref 6.4–8.2)
RBC # BLD AUTO: 4.43 M/MM3 (ref 4–5.6)
SODIUM SERPL-SCNC: 143 MMOL/L (ref 136–145)
WBC # BLD AUTO: 6.3 K/MM3 (ref 4–10)

## 2019-08-06 RX ADMIN — HEPARIN SODIUM SCH UNIT: 5000 INJECTION, SOLUTION INTRAVENOUS; SUBCUTANEOUS at 10:51

## 2019-08-06 RX ADMIN — POTASSIUM CHLORIDE, DEXTROSE MONOHYDRATE AND SODIUM CHLORIDE SCH MLS/HR: 150; 5; 450 INJECTION, SOLUTION INTRAVENOUS at 03:12

## 2019-08-06 RX ADMIN — NICOTINE SCH MG: 14 PATCH, EXTENDED RELEASE TRANSDERMAL at 09:17

## 2019-08-06 RX ADMIN — PIPERACILLIN SODIUM,TAZOBACTAM SODIUM SCH MLS/HR: 3; .375 INJECTION, POWDER, FOR SOLUTION INTRAVENOUS at 09:48

## 2019-08-06 RX ADMIN — PANTOPRAZOLE SODIUM SCH MG: 40 INJECTION, POWDER, FOR SOLUTION INTRAVENOUS at 09:16

## 2019-08-06 RX ADMIN — Medication SCH MLS/HR: at 09:16

## 2019-08-06 RX ADMIN — PIPERACILLIN SODIUM,TAZOBACTAM SODIUM SCH MLS/HR: 3; .375 INJECTION, POWDER, FOR SOLUTION INTRAVENOUS at 02:14

## 2019-08-06 NOTE — DS
Physical Examination


Vital Signs: 


 Vital Signs











Temperature  97.4 F L  08/06/19 09:54


 


Pulse Rate  61   08/06/19 09:54


 


Respiratory Rate  20   08/06/19 09:54


 


Blood Pressure  122/78   08/06/19 09:54


 


O2 Sat by Pulse Oximetry (%)  97   08/05/19 21:00











Findings/Remarks: 





stable during hospitalization, no complaints. tolerating diet. 


Constitutional: Yes: Well Nourished, No Distress, Calm


Eyes: Yes: Conjunctiva Clear, EOM Intact


HENT: Yes: Atraumatic, Normocephalic


Neck: Yes: Supple, Trachea Midline


Cardiovascular: Yes: Regular Rate and Rhythm, S1, S2


Respiratory: Yes: Regular, CTA Bilaterally


Gastrointestinal: Yes: Normal Bowel Sounds, Soft, Abdomen, Obese.  No: 

Tenderness, Tenderness, Rebound


...Rectal Exam: Yes: Deferred


Renal/: No: CVA Tenderness - Left, CVA Tenderness - Right


Breast(s): No: Mass, Skin Changes


Musculoskeletal: No: Joint Swelling, Muscle Pain


Extremities: No: Cool, Cyanosis


Edema: No


Peripheral Pulses WNL: Yes


Peripheral Pulses: Left Radial: 2+, Right Radial: 2+, Left Doralis Pedis: 2+, 

Right Dorsalis Pedis: 2+, Left Femoral: 2+, Right Femoral: 2+


Wound/Incision: Yes: Clean/Dry, Well Approximated, Staples Intact, Open to air, 

Draining (LINDA LUQ drained 50ml today serosanguinous)


Neurological: Yes: Alert, Oriented


Psychiatric: Yes: Alert, Oriented


Labs: 


 CBC, BMP





 08/06/19 08:20 





 08/06/19 08:20 











Discharge Summary


Reason For Visit: PERFORATION OF INTESTINE





perforated pre-pyloric ulcer 


Procedures: Principal: exploratory laparotomy, hollie omental patch of 

perforated pre-pyloric ulcer


Hospital Course: 





admitted for emergency procedure. uneventful surgery. leak test negative. 

stable for discharge home


Condition: Improved





- Instructions


Diet, Activity, Other Instructions: 


Postoperative instructions: You had a omental patch of gastric ulcer 

perforation  on DATE by Dr. Haim Qureshi of [facility].


 


Activity: Resume your usual activities gradually, but no heavy exertion or 

lifting more than 10-15 pounds for 4-6 weeks. Remove dressings 48 hours after 

surgery, if they are not already off. You may shower daily starting then, just 

pat the incision areas dry. No bath or swimming until skin incisions have 

healed. Staples should not need to be recovered with any dressings, unless you 

have been told otherwise. Eat lightly at first, but advance to your usual diet 

as tolerated.


 


Pain: For pain, you may use and alternate Tylenol (acetaminophen) 1-2 pills as 

needed; Do not take more than 4000 mg of acetaminophen in a day. Take 

medications as prescribed or indicated on the labeling.


 


Follow-up: Call Dr. Qureshi' office at [phone] to make your postop 

appointment (Wednesday in approximately 2 weeks after surgery as advised). 

Clinic is held in the [facility] on the first floor of [facility].


 


Call the office if you have:


* increasing pain not responsive to pain medication


* fever of 101F or higher


* vomiting


* unusual or increasing bleeding or drainage from wounds


* increasing redness or swelling at wound sites


* inability to urinate


 


Also, see your primary medical doctor within 1-2 weeks.


Disposition: HOME





- Home Medications


Comprehensive Discharge Medication List: 


Ambulatory Orders





Amox-Tr/K Cl [Augmentin - 875Mg Tablet] 1 tab PO BID #14 tablet 08/05/19 


Oxycodone HCl/Acetaminophen [Percocet 5/325 -] 1 tab PO Q6H #40 tab MDD 5 08/05/ 19 


Pantoprazole Sodium [Protonix] 40 mg PO DAILY 30 Days #30 tablet. 08/05/19

## 2019-08-06 NOTE — PN
Progress Note, Physician





- Current Medication List


Current Medications: 


Active Medications





Acetaminophen (Ofirmev Injection -)  1,000 mg IVPB Q6H PRN


   PRN Reason: FEVER


   Last Admin: 08/01/19 20:20 Dose:  1,000 mg


Heparin Sodium (Porcine) (Heparin -)  5,000 unit SQ Q8H-IV [facility]


   Last Admin: 08/06/19 10:51 Dose:  5,000 unit


Hydromorphone HCl (Hydromorphone 10 Mg/50 Ml-Ns)  10 mg PCA PCA CARLO; Protocol


   Stop: 08/08/19 17:14


   Last Admin: 08/01/19 20:30 Dose:  10 mg


Metronidazole (Flagyl 500mg Premixed Ivpb -)  500 mg in 100 mls @ 100 mls/hr 

IVPB Q8H-IV [facility]


   Last Admin: 08/06/19 10:48 Dose:  100 mls/hr


Piperacillin Sod/Tazobactam (Sod 3.375 gm/ Dextrose)  50 mls @ 100 mls/hr IVPB 

Q8H-IV [facility]; Protocol


   Last Admin: 08/06/19 09:48 Dose:  100 mls/hr


Fluconazole (Diflucan 100 Mg/Ns Premixed Ivpb -)  50 mls @ 50 mls/hr IVPB DAILY 

[facility]


   Last Admin: 08/06/19 09:16 Dose:  50 mls/hr


Potassium Chloride/Dextrose/Sod Cl (D5-1/2ns+20 Meq Kcl -)  20 meq in 1,000 mls 

@ 100 mls/hr IV ASDIR [facility]


   Last Admin: 08/06/19 03:12 Dose:  100 mls/hr


Nicotine (Nicoderm Patch -)  14 mg TD DAILY [facility]


   Last Admin: 08/06/19 09:17 Dose:  14 mg


Ondansetron HCl (Zofran Injection)  4 mg IVPUSH Q8H PRN


   PRN Reason: NAUSEA


Pantoprazole Sodium (Protonix Iv)  40 mg IVPB BID [facility]


   Last Admin: 08/06/19 09:16 Dose:  40 mg











- Objective


Vital Signs: 


 Vital Signs











Temperature  97.4 F L  08/06/19 09:54


 


Pulse Rate  61   08/06/19 09:54


 


Respiratory Rate  20   08/06/19 09:54


 


Blood Pressure  122/78   08/06/19 09:54


 


O2 Sat by Pulse Oximetry (%)  97   08/05/19 21:00











Labs: 


 CBC, BMP





 08/06/19 08:20 





 08/06/19 08:20 





 INR, PTT











INR  0.94  (0.83-1.09)   08/01/19  11:56

## 2020-11-23 NOTE — PN
Progress Note, Physician


Chief Complaint: 





abdominal pain 


History of Present Illness: 





50yo male PMH developmental delay, HTN, painful LE varicose veins, s/p left 

foot surgery and back surgery presented to ED this afternoon report acute onset 

upper abdominal pain gradually worsening since 8AM. stable since surgery





- Current Medication List


Current Medications: 


Active Medications





Acetaminophen (Ofirmev Injection -)  1,000 mg IVPB Q6H PRN


   PRN Reason: FEVER


   Last Admin: 08/01/19 20:20 Dose:  1,000 mg


Heparin Sodium (Porcine) (Heparin -)  5,000 unit SQ Q8H-IV [facility]


   Last Admin: 08/04/19 09:08 Dose:  5,000 unit


Hydromorphone HCl (Hydromorphone 10 Mg/50 Ml-Ns)  10 mg PCA PCA [facility]; Protocol


   Stop: 08/08/19 17:14


   Last Admin: 08/01/19 20:30 Dose:  10 mg


Metronidazole (Flagyl 500mg Premixed Ivpb -)  500 mg in 100 mls @ 100 mls/hr 

IVPB Q8H-IV [facility]


   Last Admin: 08/04/19 09:07 Dose:  100 mls/hr


Piperacillin Sod/Tazobactam (Sod 3.375 gm/ Dextrose)  50 mls @ 100 mls/hr IVPB 

Q8H-IV [facility]; Protocol


   Last Admin: 08/04/19 10:23 Dose:  100 mls/hr


Fluconazole (Diflucan 100 Mg/Ns Premixed Ivpb -)  50 mls @ 50 mls/hr IVPB DAILY 

[facility]


   Last Admin: 08/04/19 12:12 Dose:  50 mls/hr


Potassium Chloride/Dextrose/Sod Cl (D5-1/2ns+20 Meq Kcl -)  20 meq in 1,000 mls 

@ 125 mls/hr IV ASDIR [facility]


   Last Admin: 08/03/19 18:04 Dose:  125 mls/hr


Ondansetron HCl (Zofran Injection)  4 mg IVPUSH Q8H PRN


   PRN Reason: NAUSEA


Pantoprazole Sodium (Protonix Iv)  40 mg IVPB BID [facility]


   Last Admin: 08/04/19 09:44 Dose:  40 mg











- Objective


Vital Signs: 


 Vital Signs











Temperature  97.9 F   08/04/19 09:00


 


Pulse Rate  68   08/04/19 09:00


 


Respiratory Rate  18   08/04/19 09:00


 


Blood Pressure  135/80   08/04/19 09:00


 


O2 Sat by Pulse Oximetry (%)  94 L  08/04/19 09:00











Constitutional: Yes: Well Nourished, No Distress, Calm


Eyes: Yes: Conjunctiva Clear, EOM Intact


HENT: Yes: Atraumatic, Normocephalic


Neck: Yes: Supple, Trachea Midline


Cardiovascular: Yes: Regular Rate and Rhythm, S1, S2


Respiratory: Yes: Regular, CTA Bilaterally


Gastrointestinal: Yes: Normal Bowel Sounds, Soft, Tenderness


...Rectal Exam: Yes: Deferred


Genitourinary: No: CVA Tenderness - Left, CVA Tenderness - Right


Breast(s): No: Mass, Skin Changes


Musculoskeletal: No: Joint Stiffness, Joint Swelling


Extremities: No: Cool, Cyanosis


Edema: No


Peripheral Pulses WNL: Yes


Peripheral Pulses: Left Radial: 2+, Right Radial: 2+, Left Doralis Pedis: 2+, 

Right Dorsalis Pedis: 2+, Left Femoral: 2+, Right Femoral: 2+


Integumentary: Yes: Incision.  No: Jaundice, Rash


Wound/Incision: Yes: Clean/Dry, Well Approximated, Staples Intact, Open to air, 

Draining (LUQ LINDA drain)


Neurological: Yes: Alert, Oriented


Psychiatric: Yes: Alert, Oriented


Labs: 


 CBC, BMP





 08/04/19 07:45 





 08/04/19 07:45 





 INR, PTT











INR  0.94  (0.83-1.09)   08/01/19  11:56    














Problem List





- Problems


(1) Perforated abdominal viscus


Assessment/Plan: 


50yo male HTN POD#4 s/p Exp Lap and Arturo Patch of gastic ulcer








clears after leak test is negative 


IV antibiotics per ID 


Medical management per hospitalist 


trend labs and correct electrolytes 


Plan for gastric leak test Monday 8/5











Code(s): NLO1432 -    





(2) HTN (hypertension)


Code(s): I10 - ESSENTIAL (PRIMARY) HYPERTENSION   


Qualifiers: 


   Hypertension type: essential hypertension   Qualified Code(s): I10 - 

Essential (primary) hypertension   





(3) Abdominal pain in male


Code(s): R10.9 - UNSPECIFIED ABDOMINAL PAIN   





(4) NSAID induced gastritis


Code(s): K29.60 - OTHER GASTRITIS WITHOUT BLEEDING; T39.395A - ADVERSE EFFECT 

OF NONSTEROIDAL ANTI-INFLAMMATORY DRUGS, INIT   





(5) NSAID-associated gastropathy


Code(s): K31.9 - DISEASE OF STOMACH AND DUODENUM, UNSPECIFIED; T39.395A - 

ADVERSE EFFECT OF NONSTEROIDAL ANTI-INFLAMMATORY DRUGS, INIT Authored by Resident/PA/NP

## 2022-01-05 ENCOUNTER — HOSPITAL ENCOUNTER (OUTPATIENT)
Dept: HOSPITAL 74 - JER | Age: 52
Setting detail: OBSERVATION
LOS: 3 days | Discharge: HOME | End: 2022-01-08
Attending: INTERNAL MEDICINE | Admitting: INTERNAL MEDICINE
Payer: COMMERCIAL

## 2022-01-05 VITALS — BODY MASS INDEX: 25.4 KG/M2

## 2022-01-05 DIAGNOSIS — L03.116: ICD-10-CM

## 2022-01-05 DIAGNOSIS — F17.210: ICD-10-CM

## 2022-01-05 DIAGNOSIS — M21.612: ICD-10-CM

## 2022-01-05 DIAGNOSIS — L02.612: Primary | ICD-10-CM

## 2022-01-05 DIAGNOSIS — Z29.9: ICD-10-CM

## 2022-01-05 DIAGNOSIS — R62.50: ICD-10-CM

## 2022-01-05 DIAGNOSIS — G89.29: ICD-10-CM

## 2022-01-05 DIAGNOSIS — E11.9: ICD-10-CM

## 2022-01-05 DIAGNOSIS — I10: ICD-10-CM

## 2022-01-05 LAB
ALBUMIN SERPL-MCNC: 4.1 G/DL (ref 3.4–5)
ALP SERPL-CCNC: 84 U/L (ref 45–117)
ALT SERPL-CCNC: 54 U/L (ref 13–61)
ANION GAP SERPL CALC-SCNC: 7 MMOL/L (ref 8–16)
AST SERPL-CCNC: 26 U/L (ref 15–37)
BASOPHILS # BLD: 0.9 % (ref 0–2)
BILIRUB SERPL-MCNC: 0.4 MG/DL (ref 0.2–1)
BUN SERPL-MCNC: 11 MG/DL (ref 7–18)
CALCIUM SERPL-MCNC: 9.8 MG/DL (ref 8.5–10.1)
CHLORIDE SERPL-SCNC: 106 MMOL/L (ref 98–107)
CO2 SERPL-SCNC: 25 MMOL/L (ref 21–32)
CREAT SERPL-MCNC: 0.9 MG/DL (ref 0.55–1.3)
DEPRECATED RDW RBC AUTO: 14.2 % (ref 11.9–15.9)
EOSINOPHIL # BLD: 2.7 % (ref 0–4.5)
ERYTHROCYTE [SEDIMENTATION RATE] IN BLOOD BY WESTERGREN METHOD: 5 MM/HR (ref 0–20)
GLUCOSE SERPL-MCNC: 104 MG/DL (ref 74–106)
HCT VFR BLD CALC: 44.9 % (ref 35.4–49)
HGB BLD-MCNC: 15.2 GM/DL (ref 11.7–16.9)
INR BLD: 0.98 (ref 0.83–1.09)
LYMPHOCYTES # BLD: 44.2 % (ref 8–40)
MCH RBC QN AUTO: 29.5 PG (ref 25.7–33.7)
MCHC RBC AUTO-ENTMCNC: 33.8 G/DL (ref 32–35.9)
MCV RBC: 87.4 FL (ref 80–96)
MONOCYTES # BLD AUTO: 6.2 % (ref 3.8–10.2)
NEUTROPHILS # BLD: 46 % (ref 42.8–82.8)
PLATELET # BLD AUTO: 229 10^3/UL (ref 134–434)
PMV BLD: 8.5 FL (ref 7.5–11.1)
PROT SERPL-MCNC: 7.6 G/DL (ref 6.4–8.2)
PT PNL PPP: 11.5 SEC (ref 9.7–13)
RBC # BLD AUTO: 5.13 M/MM3 (ref 4–5.6)
SODIUM SERPL-SCNC: 138 MMOL/L (ref 136–145)
WBC # BLD AUTO: 8.4 K/MM3 (ref 4–10)

## 2022-01-05 PROCEDURE — U0005 INFEC AGEN DETEC AMPLI PROBE: HCPCS

## 2022-01-05 PROCEDURE — G0378 HOSPITAL OBSERVATION PER HR: HCPCS

## 2022-01-05 PROCEDURE — U0003 INFECTIOUS AGENT DETECTION BY NUCLEIC ACID (DNA OR RNA); SEVERE ACUTE RESPIRATORY SYNDROME CORONAVIRUS 2 (SARS-COV-2) (CORONAVIRUS DISEASE [COVID-19]), AMPLIFIED PROBE TECHNIQUE, MAKING USE OF HIGH THROUGHPUT TECHNOLOGIES AS DESCRIBED BY CMS-2020-01-R: HCPCS

## 2022-01-06 PROCEDURE — 3E023GC INTRODUCTION OF OTHER THERAPEUTIC SUBSTANCE INTO MUSCLE, PERCUTANEOUS APPROACH: ICD-10-PCS | Performed by: INTERNAL MEDICINE

## 2022-01-06 PROCEDURE — 3E03329 INTRODUCTION OF OTHER ANTI-INFECTIVE INTO PERIPHERAL VEIN, PERCUTANEOUS APPROACH: ICD-10-PCS | Performed by: INTERNAL MEDICINE

## 2022-01-06 RX ADMIN — HEPARIN SODIUM SCH UNIT: 5000 INJECTION, SOLUTION INTRAVENOUS; SUBCUTANEOUS at 22:42

## 2022-01-06 RX ADMIN — HEPARIN SODIUM SCH UNIT: 5000 INJECTION, SOLUTION INTRAVENOUS; SUBCUTANEOUS at 19:05

## 2022-01-06 RX ADMIN — INSULIN ASPART SCH: 100 INJECTION, SOLUTION INTRAVENOUS; SUBCUTANEOUS at 06:48

## 2022-01-06 RX ADMIN — INSULIN ASPART SCH UNITS: 100 INJECTION, SOLUTION INTRAVENOUS; SUBCUTANEOUS at 17:52

## 2022-01-06 RX ADMIN — INSULIN ASPART SCH: 100 INJECTION, SOLUTION INTRAVENOUS; SUBCUTANEOUS at 11:27

## 2022-01-06 RX ADMIN — PIPERACILLIN SODIUM,TAZOBACTAM SODIUM SCH MLS/HR: 3; .375 INJECTION, POWDER, FOR SOLUTION INTRAVENOUS at 17:54

## 2022-01-06 RX ADMIN — INSULIN ASPART SCH UNITS: 100 INJECTION, SOLUTION INTRAVENOUS; SUBCUTANEOUS at 22:52

## 2022-01-06 RX ADMIN — PETROLATUM SCH APPLIC: 42 OINTMENT TOPICAL at 17:50

## 2022-01-06 RX ADMIN — COLLAGENASE SANTYL SCH APPLIC: 250 OINTMENT TOPICAL at 22:42

## 2022-01-07 RX ADMIN — PETROLATUM SCH APPLIC: 42 OINTMENT TOPICAL at 11:33

## 2022-01-07 RX ADMIN — INSULIN ASPART SCH: 100 INJECTION, SOLUTION INTRAVENOUS; SUBCUTANEOUS at 06:52

## 2022-01-07 RX ADMIN — INSULIN ASPART SCH: 100 INJECTION, SOLUTION INTRAVENOUS; SUBCUTANEOUS at 16:58

## 2022-01-07 RX ADMIN — HEPARIN SODIUM SCH UNIT: 5000 INJECTION, SOLUTION INTRAVENOUS; SUBCUTANEOUS at 06:52

## 2022-01-07 RX ADMIN — PIPERACILLIN SODIUM,TAZOBACTAM SODIUM SCH MLS/HR: 3; .375 INJECTION, POWDER, FOR SOLUTION INTRAVENOUS at 03:14

## 2022-01-07 RX ADMIN — INSULIN ASPART SCH: 100 INJECTION, SOLUTION INTRAVENOUS; SUBCUTANEOUS at 11:36

## 2022-01-07 RX ADMIN — HEPARIN SODIUM SCH UNIT: 5000 INJECTION, SOLUTION INTRAVENOUS; SUBCUTANEOUS at 21:57

## 2022-01-07 RX ADMIN — HEPARIN SODIUM SCH UNIT: 5000 INJECTION, SOLUTION INTRAVENOUS; SUBCUTANEOUS at 15:04

## 2022-01-07 RX ADMIN — INSULIN ASPART SCH: 100 INJECTION, SOLUTION INTRAVENOUS; SUBCUTANEOUS at 21:57

## 2022-01-07 RX ADMIN — COLLAGENASE SANTYL SCH APPLIC: 250 OINTMENT TOPICAL at 11:33

## 2022-01-07 RX ADMIN — PIPERACILLIN SODIUM,TAZOBACTAM SODIUM SCH MLS/HR: 3; .375 INJECTION, POWDER, FOR SOLUTION INTRAVENOUS at 16:59

## 2022-01-07 RX ADMIN — PIPERACILLIN SODIUM,TAZOBACTAM SODIUM SCH MLS/HR: 3; .375 INJECTION, POWDER, FOR SOLUTION INTRAVENOUS at 11:32

## 2022-01-08 VITALS — DIASTOLIC BLOOD PRESSURE: 72 MMHG | HEART RATE: 66 BPM | TEMPERATURE: 98.2 F | SYSTOLIC BLOOD PRESSURE: 106 MMHG

## 2022-01-08 LAB
ALBUMIN SERPL-MCNC: 3.6 G/DL (ref 3.4–5)
ALP SERPL-CCNC: 72 U/L (ref 45–117)
ALT SERPL-CCNC: 71 U/L (ref 13–61)
ANION GAP SERPL CALC-SCNC: 7 MMOL/L (ref 8–16)
AST SERPL-CCNC: 41 U/L (ref 15–37)
BASOPHILS # BLD: 1.1 % (ref 0–2)
BILIRUB SERPL-MCNC: 0.5 MG/DL (ref 0.2–1)
BUN SERPL-MCNC: 10.9 MG/DL (ref 7–18)
CALCIUM SERPL-MCNC: 9.5 MG/DL (ref 8.5–10.1)
CHLORIDE SERPL-SCNC: 106 MMOL/L (ref 98–107)
CO2 SERPL-SCNC: 28 MMOL/L (ref 21–32)
CREAT SERPL-MCNC: 1 MG/DL (ref 0.55–1.3)
DEPRECATED RDW RBC AUTO: 13.6 % (ref 11.9–15.9)
EOSINOPHIL # BLD: 3.1 % (ref 0–4.5)
GLUCOSE SERPL-MCNC: 103 MG/DL (ref 74–106)
HCT VFR BLD CALC: 44.1 % (ref 35.4–49)
HGB BLD-MCNC: 14.9 GM/DL (ref 11.7–16.9)
LYMPHOCYTES # BLD: 43.9 % (ref 8–40)
MCH RBC QN AUTO: 29.7 PG (ref 25.7–33.7)
MCHC RBC AUTO-ENTMCNC: 33.9 G/DL (ref 32–35.9)
MCV RBC: 87.9 FL (ref 80–96)
MONOCYTES # BLD AUTO: 6.5 % (ref 3.8–10.2)
NEUTROPHILS # BLD: 45.4 % (ref 42.8–82.8)
PLATELET # BLD AUTO: 204 10^3/UL (ref 134–434)
PMV BLD: 8.9 FL (ref 7.5–11.1)
PROT SERPL-MCNC: 6.8 G/DL (ref 6.4–8.2)
RBC # BLD AUTO: 5.02 M/MM3 (ref 4–5.6)
SODIUM SERPL-SCNC: 141 MMOL/L (ref 136–145)
WBC # BLD AUTO: 5.8 K/MM3 (ref 4–10)

## 2022-01-08 RX ADMIN — PIPERACILLIN SODIUM,TAZOBACTAM SODIUM SCH MLS/HR: 3; .375 INJECTION, POWDER, FOR SOLUTION INTRAVENOUS at 09:33

## 2022-01-08 RX ADMIN — INSULIN ASPART SCH: 100 INJECTION, SOLUTION INTRAVENOUS; SUBCUTANEOUS at 06:57

## 2022-01-08 RX ADMIN — PIPERACILLIN SODIUM,TAZOBACTAM SODIUM SCH MLS/HR: 3; .375 INJECTION, POWDER, FOR SOLUTION INTRAVENOUS at 02:50

## 2022-01-08 RX ADMIN — HEPARIN SODIUM SCH UNIT: 5000 INJECTION, SOLUTION INTRAVENOUS; SUBCUTANEOUS at 14:02

## 2022-01-08 RX ADMIN — COLLAGENASE SANTYL SCH APPLIC: 250 OINTMENT TOPICAL at 09:33

## 2022-01-08 RX ADMIN — INSULIN ASPART SCH: 100 INJECTION, SOLUTION INTRAVENOUS; SUBCUTANEOUS at 16:40

## 2022-01-08 RX ADMIN — INSULIN ASPART SCH: 100 INJECTION, SOLUTION INTRAVENOUS; SUBCUTANEOUS at 11:14

## 2022-01-08 RX ADMIN — PETROLATUM SCH APPLIC: 42 OINTMENT TOPICAL at 09:33

## 2022-01-08 RX ADMIN — HEPARIN SODIUM SCH UNIT: 5000 INJECTION, SOLUTION INTRAVENOUS; SUBCUTANEOUS at 06:49

## 2025-04-24 ENCOUNTER — HOSPITAL ENCOUNTER (EMERGENCY)
Dept: HOSPITAL 74 - JER | Age: 55
Discharge: LEFT BEFORE DISCHARGE | End: 2025-04-24
Payer: COMMERCIAL

## 2025-04-24 VITALS — TEMPERATURE: 98 F | HEART RATE: 71 BPM | DIASTOLIC BLOOD PRESSURE: 77 MMHG | SYSTOLIC BLOOD PRESSURE: 110 MMHG

## 2025-04-24 VITALS — BODY MASS INDEX: 25.5 KG/M2

## 2025-04-24 VITALS — RESPIRATION RATE: 18 BRPM

## 2025-04-24 DIAGNOSIS — L03.116: Primary | ICD-10-CM

## 2025-04-24 LAB
ALBUMIN SERPL-MCNC: 3.7 G/DL (ref 3.4–5)
BILIRUB SERPL-MCNC: 0.4 MG/DL (ref 0.2–1)
BUN SERPL-MCNC: 11.1 MG/DL (ref 7–18)
CALCIUM SERPL-MCNC: 10.5 MG/DL (ref 8.5–10.1)
CREAT SERPL-MCNC: 0.9 MG/DL (ref 0.55–1.3)
ERYTHROCYTE [DISTWIDTH] IN BLOOD: 13.2 % (ref 12.2–16.1)
ERYTHROCYTE [SEDIMENTATION RATE] IN BLOOD BY WESTERGREN METHOD: 13 MM/HR (ref 0–20)
MCHC RBC-ENTMCNC: 33.3 G/DL (ref 32.3–36.5)
PLATELET # BLD AUTO: 229 X10^3/UL (ref 163–337)
PMV BLD: 10.4 FL (ref 9.4–12.4)
POTASSIUM SERPLBLD-SCNC: 4.7 MMOL/L (ref 3.5–5.1)
PROT SERPL-MCNC: 7.4 G/DL (ref 6.4–8.2)

## 2025-04-24 RX ADMIN — PIPERACILLIN SODIUM,TAZOBACTAM SODIUM ONE MLS/HR: 3; .375 INJECTION, POWDER, FOR SOLUTION INTRAVENOUS at 19:25

## 2025-04-24 RX ADMIN — VANCOMYCIN ONE MLS/HR: 1.75 INJECTION, SOLUTION INTRAVENOUS at 20:09

## 2025-04-24 RX ADMIN — DALBAVANCIN ONE MLS/HR: 500 INJECTION, POWDER, FOR SOLUTION INTRAVENOUS at 21:48
